# Patient Record
Sex: MALE | Race: WHITE | NOT HISPANIC OR LATINO
[De-identification: names, ages, dates, MRNs, and addresses within clinical notes are randomized per-mention and may not be internally consistent; named-entity substitution may affect disease eponyms.]

---

## 2021-10-25 PROBLEM — Z00.00 ENCOUNTER FOR PREVENTIVE HEALTH EXAMINATION: Status: ACTIVE | Noted: 2021-10-25

## 2021-10-28 ENCOUNTER — APPOINTMENT (OUTPATIENT)
Dept: CARDIOLOGY | Facility: CLINIC | Age: 66
End: 2021-10-28
Payer: MEDICARE

## 2021-10-28 VITALS
WEIGHT: 227 LBS | TEMPERATURE: 98.4 F | HEART RATE: 97 BPM | DIASTOLIC BLOOD PRESSURE: 86 MMHG | RESPIRATION RATE: 15 BRPM | HEIGHT: 70 IN | OXYGEN SATURATION: 95 % | BODY MASS INDEX: 32.5 KG/M2 | SYSTOLIC BLOOD PRESSURE: 138 MMHG

## 2021-10-28 VITALS — SYSTOLIC BLOOD PRESSURE: 160 MMHG | DIASTOLIC BLOOD PRESSURE: 80 MMHG

## 2021-10-28 PROCEDURE — 99204 OFFICE O/P NEW MOD 45 MIN: CPT

## 2021-10-28 PROCEDURE — 93000 ELECTROCARDIOGRAM COMPLETE: CPT

## 2021-10-28 NOTE — PHYSICAL EXAM
[Well Developed] : well developed [Well Nourished] : well nourished [No Acute Distress] : no acute distress [Normal Conjunctiva] : normal conjunctiva [Normal Venous Pressure] : normal venous pressure [No Carotid Bruit] : no carotid bruit [Normal S1, S2] : normal S1, S2 [No Rub] : no rub [No Gallop] : no gallop [5th Left ICS - MCL] : palpated at the 5th LICS in the midclavicular line [Normal] : normal [Normal Rate] : normal [Rhythm Regular] : regular [Normal S1] : normal S1 [Normal S2] : normal S2 [I] : a grade 1 [Constant] : constant [2+] : left 2+ [Clear Lung Fields] : clear lung fields [Good Air Entry] : good air entry [No Respiratory Distress] : no respiratory distress  [Soft] : abdomen soft [Non Tender] : non-tender [No Masses/organomegaly] : no masses/organomegaly [Normal Bowel Sounds] : normal bowel sounds [Normal Gait] : normal gait [No Edema] : no edema [No Cyanosis] : no cyanosis [No Clubbing] : no clubbing [No Varicosities] : no varicosities [No Rash] : no rash [No Skin Lesions] : no skin lesions [Moves all extremities] : moves all extremities [No Focal Deficits] : no focal deficits [Normal Speech] : normal speech [Alert and Oriented] : alert and oriented [Normal memory] : normal memory

## 2021-10-28 NOTE — REASON FOR VISIT
[CV Risk Factors and Non-Cardiac Disease] : CV risk factors and non-cardiac disease [Hyperlipidemia] : hyperlipidemia [Hypertension] : hypertension [Carotid, Aortic and Peripheral Vascular Disease] : carotid, aortic and peripheral vascular disease [FreeTextEntry3] : Dr. Cardona [FreeTextEntry1] : 67yo M with a PMHx of HTN, HLD, PVD presents to the office for cardiac evaluation.  Pt was seen by his PMD Dr. Cardona x 2 weeks ago and was recently started on amlodipine 5mg daily for his elevated BP.  Pt had LYNDA performed on 9/20/21 due to his intermittent claudication.  Study showed diffuse partially calcified plaque present in the external iliac, common femoral, and popliteal arteries with biphasic waveform and Doppler spectral broadening through systole.  Three vessel runoff is present in the RLE and two vessel run off is present in the LLE; Patent left peroneal artery is not visualized;  Pt was placed on cilostazol 100mg BID by his PMD for such findings and is f/u with in 1-2 weeks.  Pt had a stent placed in his R leg on 12/5/2014.  Pt also had a B/L Duplex US of his carotid arteries on 9/20/21 which did not show any significant stenosis or occlusion.\par \par Pt states that he sometimes feels SOB after walking about 2 blocks. Pt denies CP, palpitations,dizziness, LE edema.\par \par Lipid Panel performed on 9/14/21 showed a total cholesterol of 208, triglycerides of 245, LDL of 117.\par \par Pt is a current every day smoker (iPPD x 52 years)\par \par Family Hx: Mother- HTN, DM; Father- HTN\par \par

## 2021-10-28 NOTE — DISCUSSION/SUMMARY
[FreeTextEntry1] : This is a 66-year-old male with past medical history significant for peripheral arterial disease, Status post stent to his right lower extremity, hypertension, hyperlipidemia, who comes in for cardiac consultation.  He denies chest pain, shortness of breath, dizziness or syncope.\par He has no history of rheumatic fever.  His cardiac risk factors include smoking up to 3 packs per day for over 30 years, is currently reducing his nicotine intake), hypertension, and hyperlipidemia.\par He has claudication in his left lower extremity when he walks more than one block.\par Electrocardiogram done October 28, 2021 and showed normal sinus rhythm rate 97 beats per min is otherwise remarkable for right bundle branch block, and T-wave inversions in V1 through V4.\par \par A lipid panel done by his primary care physician September 14, 2021 demonstrated an LDL cholesterol calculated at 117 mg/dL, total cholesterol 208 mg/dL combination of 54 mg/dL, triglycerides 245 mg/dL.\par He was recently started on Norvasc 5 mg daily for hypertension, and Pletal 100 mg twice a day.\par Smoking cessation was discussed in detail and reinforced.\par The patient will schedule an exercise stress test to rule out significant coronary artery disease.\par We will schedule a Doppler examination to evaluate a murmur, left ventricular function, chamber size, rule out hypertrophy.\par The patient had LYNDA performed on 9/20/21 due to his intermittent claudication.  Study showed diffuse partially calcified plaque present in the external iliac, common femoral, and popliteal arteries with biphasic waveform and Doppler spectral broadening through systole.  Three vessel runoff is present in the RLE and two vessel run off is present in the LLE; Patent left peroneal artery is not visualized;  Pt was placed on cilostazol 100mg BID by his PMD for such findings and is f/u with in 1-2 weeks.  Pt had a stent placed in his R leg on 12/5/2014.  Pt also had a B/L Duplex US of his carotid arteries on 9/20/21 which did not show any significant stenosis or occlusion.\par Pt states that he sometimes feels SOB after walking about 2 blocks. \par Pt is a current every day smoker (iPPD x 52 years)\par \par The patient was started on Crestor 20 mg per day given the presence of atherosclerotic disease with an LDL target of less than 70 mg/dL .He will have new blood work in 6-8 weeks.\par \par The patient is instructed to followup with his primary care physician.\par \par Thank you for allowing participate in the care of your patient.\par Please do not hesitate to call for any further questions.

## 2021-11-01 RX ORDER — CILOSTAZOL 100 MG/1
100 TABLET ORAL TWICE DAILY
Qty: 60 | Refills: 3 | Status: ACTIVE | COMMUNITY
Start: 2021-11-01

## 2021-11-18 ENCOUNTER — APPOINTMENT (OUTPATIENT)
Dept: CARDIOLOGY | Facility: CLINIC | Age: 66
End: 2021-11-18
Payer: MEDICARE

## 2021-11-18 PROCEDURE — 93306 TTE W/DOPPLER COMPLETE: CPT

## 2022-01-11 ENCOUNTER — APPOINTMENT (OUTPATIENT)
Dept: CARDIOLOGY | Facility: CLINIC | Age: 67
End: 2022-01-11
Payer: MEDICARE

## 2022-01-11 VITALS
DIASTOLIC BLOOD PRESSURE: 80 MMHG | TEMPERATURE: 98 F | HEIGHT: 70 IN | HEART RATE: 96 BPM | WEIGHT: 225 LBS | RESPIRATION RATE: 15 BRPM | SYSTOLIC BLOOD PRESSURE: 160 MMHG | BODY MASS INDEX: 32.21 KG/M2 | OXYGEN SATURATION: 97 %

## 2022-01-11 PROCEDURE — 99213 OFFICE O/P EST LOW 20 MIN: CPT | Mod: 25

## 2022-01-11 PROCEDURE — 99406 BEHAV CHNG SMOKING 3-10 MIN: CPT

## 2022-01-11 PROCEDURE — 93015 CV STRESS TEST SUPVJ I&R: CPT

## 2022-01-11 NOTE — COUNSELING
[Yes] : Risk of tobacco use and health benefits of smoking cessation discussed: Yes [Cessation strategies including cessation program discussed] : Cessation strategies including cessation program discussed [Use of nicotine replacement therapies and other medications discussed] : Use of nicotine replacement therapies and other medications discussed [Encouraged to pick a quit date and identify support needed to quit] : Encouraged to pick a quit date and identify support needed to quit [No] : Not willing to quit smoking [FreeTextEntry1] : 4

## 2022-01-11 NOTE — REASON FOR VISIT
[CV Risk Factors and Non-Cardiac Disease] : CV risk factors and non-cardiac disease [Hyperlipidemia] : hyperlipidemia [Hypertension] : hypertension [Carotid, Aortic and Peripheral Vascular Disease] : carotid, aortic and peripheral vascular disease [FreeTextEntry3] : Dr. Cardona [FreeTextEntry1] : 68 yo M with a PMHx of HTN, HLD, PVD presents to the office for follow up cardiac evaluation.  \par \par Today he is feeling generally well. Pt states that he sometimes feels SOB after walking about 2 blocks. Pt denies CP, palpitations,dizziness. He states that his blood pressure readings have been high and that his \par PMD increased his amlodipine from 5 mg to 10 mg. \par \par Patient had a hypertensive response to exercise stress test performed today, he lasted 2 mins 30 secs. \par \par Lipid Panel performed on 9/14/21 showed a total cholesterol of 208, triglycerides of 245, LDL of 117.\par \par Pt is a current every day smoker (1PPD x 52 years)\par \par Family Hx: Mother- HTN, DM; Father- HTN\par \par

## 2022-01-11 NOTE — DISCUSSION/SUMMARY
[FreeTextEntry1] : This is a 67-year-old male with past medical history significant for claudication/peripheral arterial disease, status post stent to his right lower extremity, hypertension, hyperlipidemia, right bundle branch block, who comes in for cardiac follow-up evaluation.  He denies chest pain, shortness of breath, dizziness or syncope.  He does have significant claudication. He has no history of rheumatic fever.  \par His cardiac risk factors include smoking up to 3 packs per day for over 30 years, is currently reducing his nicotine intake), hypertension, and hyperlipidemia.\par He has claudication in his left lower extremity when he walks more than one block.\par Echo Doppler examination done November 18, 2021 demonstrate minimal mitral valve regurgitation minimal tricuspid valve regurgitation with normal left ventricular ejection fraction 65%.\par Exercise stress test done today demonstrated no evidence of myocardial ischemia at a low workload; patient was only able to exercise into stage one of the Yaakov protocol.  Test was stopped due to claudication.\par The patient also had a hypertensive response to exercise.\par He will start on Micardis hydrochlorothiazide 80/12.5 mg in the morning, and continue amlodipine 10 mg in the evening/after dinner (this was recently increased by his primary care physician).\par The patient may have significant underlying coronary artery disease and I have recommended he schedule a coronary artery CTA to rule out significant coronary artery disease.\par I believe that a pharmacologic nuclear stress test with be abnormal given his body habitus, plus or minus the presence of coronary artery disease.\par He will follow-up with me in 1 month to reevaluate his blood pressure.  I have asked him to schedule a coronary CTA as soon as possible.  He will continue on aspirin 81 mg daily and Crestor 20 mg/day for primary prevention.\par \par A lipid panel done December 1, 2021 demonstrated cholesterol 136, HDL 60, triglycerides 91 mg/dL, LDL cholesterol 59 mg/dL and non-HDL cholesterol 76 mg/dL.\par The patient's LDL is at target.\par Smoking cessation was reinforced.\par Electrocardiogram done October 28, 2021 and showed normal sinus rhythm rate 97 beats per min is otherwise remarkable for right bundle branch block, and T-wave inversions in V1 through V4.\par \par A lipid panel September 14, 2021 demonstrated an LDL cholesterol calculated at 117 mg/dL, total cholesterol 208 mg/dL combination of 54 mg/dL, triglycerides 245 mg/dL.\par \par The patient had LYNDA performed on 9/20/21 due to his intermittent claudication.  Study showed diffuse partially calcified plaque present in the external iliac, common femoral, and popliteal arteries with biphasic waveform and Doppler spectral broadening through systole.  Three vessel runoff is present in the RLE and two vessel run off is present in the LLE; Patent left peroneal artery is not visualized;  Pt was placed on cilostazol 100mg BID by his PMD for such findings and is f/u with in 1-2 weeks.  Pt had a stent placed in his R leg on 12/5/2014.  Pt also had a B/L Duplex US of his carotid arteries on 9/20/21 which did not show any significant stenosis or occlusion.\par Pt states that he sometimes feels SOB after walking about 2 blocks. \par Pt is a current every day smoker (iPPD x 52 years)\par \par \par \par Thank you for allowing participate in the care of your patient.\par Please do not hesitate to call for any further questions.

## 2022-01-12 ENCOUNTER — NON-APPOINTMENT (OUTPATIENT)
Age: 67
End: 2022-01-12

## 2022-01-12 RX ORDER — AMLODIPINE BESYLATE 10 MG/1
10 TABLET ORAL
Refills: 0 | Status: ACTIVE | COMMUNITY
Start: 2021-11-01

## 2022-01-28 ENCOUNTER — APPOINTMENT (OUTPATIENT)
Dept: CT IMAGING | Facility: CLINIC | Age: 67
End: 2022-01-28
Payer: MEDICARE

## 2022-01-28 ENCOUNTER — OUTPATIENT (OUTPATIENT)
Dept: OUTPATIENT SERVICES | Facility: HOSPITAL | Age: 67
LOS: 1 days | End: 2022-01-28
Payer: MEDICARE

## 2022-01-28 DIAGNOSIS — I73.9 PERIPHERAL VASCULAR DISEASE, UNSPECIFIED: ICD-10-CM

## 2022-01-28 DIAGNOSIS — R06.02 SHORTNESS OF BREATH: ICD-10-CM

## 2022-01-28 PROCEDURE — G1004: CPT

## 2022-01-28 PROCEDURE — 75574 CT ANGIO HRT W/3D IMAGE: CPT | Mod: 26,ME

## 2022-01-28 PROCEDURE — 75574 CT ANGIO HRT W/3D IMAGE: CPT | Mod: ME

## 2022-01-28 PROCEDURE — 82565 ASSAY OF CREATININE: CPT

## 2022-02-01 ENCOUNTER — OUTPATIENT (OUTPATIENT)
Dept: OUTPATIENT SERVICES | Facility: HOSPITAL | Age: 67
LOS: 1 days | End: 2022-02-01
Payer: MEDICARE

## 2022-02-01 ENCOUNTER — RESULT REVIEW (OUTPATIENT)
Age: 67
End: 2022-02-01

## 2022-02-01 DIAGNOSIS — Z00.8 ENCOUNTER FOR OTHER GENERAL EXAMINATION: ICD-10-CM

## 2022-02-01 PROCEDURE — 0502T: CPT

## 2022-02-01 PROCEDURE — 0503T: CPT

## 2022-02-01 PROCEDURE — 0504T: CPT

## 2022-02-17 ENCOUNTER — APPOINTMENT (OUTPATIENT)
Dept: CARDIOLOGY | Facility: CLINIC | Age: 67
End: 2022-02-17
Payer: MEDICARE

## 2022-02-17 VITALS
WEIGHT: 228 LBS | DIASTOLIC BLOOD PRESSURE: 82 MMHG | TEMPERATURE: 97.6 F | BODY MASS INDEX: 32.64 KG/M2 | SYSTOLIC BLOOD PRESSURE: 133 MMHG | RESPIRATION RATE: 16 BRPM | HEART RATE: 95 BPM | HEIGHT: 70 IN

## 2022-02-17 PROCEDURE — 99214 OFFICE O/P EST MOD 30 MIN: CPT

## 2022-02-17 RX ORDER — ASPIRIN ENTERIC COATED TABLETS 81 MG 81 MG/1
81 TABLET, DELAYED RELEASE ORAL
Qty: 90 | Refills: 0 | Status: ACTIVE | COMMUNITY
Start: 2022-02-17

## 2022-02-17 NOTE — DISCUSSION/SUMMARY
[FreeTextEntry1] : This is a 67-year-old male with past medical history significant for claudication/peripheral arterial disease, status post stent to his right lower extremity, hypertension, hyperlipidemia, right bundle branch block, who comes in for cardiac follow-up evaluation.  He denies chest pain, shortness of breath, dizziness or syncope.  He does have significant claudication. He has no history of rheumatic fever.  \par His cardiac risk factors include smoking up to 3 packs per day for over 30 years, is currently reducing his nicotine intake), hypertension, and hyperlipidemia.\par The patient had a normal coronary CTA including a calcium score consistent with moderate cardiovascular risk.  He had FFR done which demonstrated a severe lesion in the diagonal branch, and borderline severe lesions in the left anterior descending artery and left circumflex branch.\par The patient's activity is limited by his claudication.  He continues to smoke one pack per day.  I believe the patient has significant coronary disease and have recommended a cardiac catheterization procedure.\par The patient will followup with me after that is completed.\par \par He has claudication in his left lower extremity when he walks more than one block.\par Echo Doppler examination done November 18, 2021 demonstrate minimal mitral valve regurgitation minimal tricuspid valve regurgitation with normal left ventricular ejection fraction 65%.\par Exercise stress test done today demonstrated no evidence of myocardial ischemia at a low workload; patient was only able to exercise into stage one of the Yaakov protocol.  Test was stopped due to claudication.\par The patient also had a hypertensive response to exercise.\par He will start on Micardis hydrochlorothiazide 80/12.5 mg in the morning, and continue amlodipine 10 mg in the evening/after dinner (this was recently increased by his primary care physician).\par The patient may have significant underlying coronary artery disease and I have recommended he schedule a coronary artery CTA to rule out significant coronary artery disease.\par I believe that a pharmacologic nuclear stress test with be abnormal given his body habitus, plus or minus the presence of coronary artery disease.\par He will follow-up with me in 1 month to reevaluate his blood pressure.  I have asked him to schedule a coronary CTA as soon as possible.  He will continue on aspirin 81 mg daily and Crestor 20 mg/day for primary prevention.\par \par A lipid panel done December 1, 2021 demonstrated cholesterol 136, HDL 60, triglycerides 91 mg/dL, LDL cholesterol 59 mg/dL and non-HDL cholesterol 76 mg/dL.\par The patient's LDL is at target.\par Smoking cessation was reinforced.\par Electrocardiogram done October 28, 2021 and showed normal sinus rhythm rate 97 beats per min is otherwise remarkable for right bundle branch block, and T-wave inversions in V1 through V4.\par \par A lipid panel September 14, 2021 demonstrated an LDL cholesterol calculated at 117 mg/dL, total cholesterol 208 mg/dL combination of 54 mg/dL, triglycerides 245 mg/dL.\par \par The patient had LYNDA performed on 9/20/21 due to his intermittent claudication.  Study showed diffuse partially calcified plaque present in the external iliac, common femoral, and popliteal arteries with biphasic waveform and Doppler spectral broadening through systole.  Three vessel runoff is present in the RLE and two vessel run off is present in the LLE; Patent left peroneal artery is not visualized;  Pt was placed on cilostazol 100mg BID by his PMD for such findings and is f/u with in 1-2 weeks.  Pt had a stent placed in his R leg on 12/5/2014.  Pt also had a B/L Duplex US of his carotid arteries on 9/20/21 which did not show any significant stenosis or occlusion.\par Pt states that he sometimes feels SOB after walking about 2 blocks. \par Pt is a current every day smoker (iPPD x 52 years)\par \par \par \par Thank you for allowing participate in the care of your patient.\par Please do not hesitate to call for any further questions.

## 2022-02-17 NOTE — REASON FOR VISIT
[CV Risk Factors and Non-Cardiac Disease] : CV risk factors and non-cardiac disease [Hyperlipidemia] : hyperlipidemia [Hypertension] : hypertension [Carotid, Aortic and Peripheral Vascular Disease] : carotid, aortic and peripheral vascular disease [FreeTextEntry3] : Dr. Cardona [FreeTextEntry1] : 66 yo M with a PMHx of HTN, HLD, PVD presents to the office for follow up cardiac evaluation.  \par \par Today he is feeling generally well. Pt states that he sometimes feels SOB after walking about 2 blocks. Pt denies CP, palpitations,dizziness. He states that his blood pressure readings have been high and that his \par PMD increased his amlodipine from 5 mg to 10 mg. \par \par Patient had a hypertensive response to exercise stress test performed today, he lasted 2 mins 30 secs. \par \par Lipid Panel performed on 9/14/21 showed a total cholesterol of 208, triglycerides of 245, LDL of 117.\par \par Pt is a current every day smoker (1PPD x 52 years)\par \par Family Hx: Mother- HTN, DM; Father- HTN\par \par

## 2022-02-28 ENCOUNTER — OUTPATIENT (OUTPATIENT)
Dept: OUTPATIENT SERVICES | Facility: HOSPITAL | Age: 67
LOS: 1 days | Discharge: HOME | End: 2022-02-28
Payer: MEDICARE

## 2022-02-28 VITALS
DIASTOLIC BLOOD PRESSURE: 68 MMHG | HEART RATE: 86 BPM | OXYGEN SATURATION: 97 % | RESPIRATION RATE: 18 BRPM | SYSTOLIC BLOOD PRESSURE: 144 MMHG

## 2022-02-28 VITALS
HEIGHT: 70 IN | RESPIRATION RATE: 18 BRPM | HEART RATE: 88 BPM | OXYGEN SATURATION: 98 % | SYSTOLIC BLOOD PRESSURE: 155 MMHG | DIASTOLIC BLOOD PRESSURE: 70 MMHG | TEMPERATURE: 98 F | WEIGHT: 220.02 LBS

## 2022-02-28 DIAGNOSIS — I73.9 PERIPHERAL VASCULAR DISEASE, UNSPECIFIED: Chronic | ICD-10-CM

## 2022-02-28 DIAGNOSIS — Z98.890 OTHER SPECIFIED POSTPROCEDURAL STATES: Chronic | ICD-10-CM

## 2022-02-28 DIAGNOSIS — I25.10 ATHEROSCLEROTIC HEART DISEASE OF NATIVE CORONARY ARTERY WITHOUT ANGINA PECTORIS: ICD-10-CM

## 2022-02-28 LAB
ALBUMIN SERPL ELPH-MCNC: 4.4 G/DL — SIGNIFICANT CHANGE UP (ref 3.3–5)
ALP SERPL-CCNC: 69 U/L — SIGNIFICANT CHANGE UP (ref 40–120)
ALT FLD-CCNC: 31 U/L — SIGNIFICANT CHANGE UP (ref 10–45)
ANION GAP SERPL CALC-SCNC: 11 MMOL/L — SIGNIFICANT CHANGE UP (ref 5–17)
AST SERPL-CCNC: 19 U/L — SIGNIFICANT CHANGE UP (ref 10–40)
BILIRUB SERPL-MCNC: 0.5 MG/DL — SIGNIFICANT CHANGE UP (ref 0.2–1.2)
BUN SERPL-MCNC: 27 MG/DL — HIGH (ref 7–23)
CALCIUM SERPL-MCNC: 9.6 MG/DL — SIGNIFICANT CHANGE UP (ref 8.4–10.5)
CHLORIDE SERPL-SCNC: 102 MMOL/L — SIGNIFICANT CHANGE UP (ref 96–108)
CO2 SERPL-SCNC: 23 MMOL/L — SIGNIFICANT CHANGE UP (ref 22–31)
CREAT SERPL-MCNC: 1.4 MG/DL — HIGH (ref 0.5–1.3)
GLUCOSE SERPL-MCNC: 114 MG/DL — HIGH (ref 70–99)
HCT VFR BLD CALC: 45.2 % — SIGNIFICANT CHANGE UP (ref 39–50)
HGB BLD-MCNC: 15.4 G/DL — SIGNIFICANT CHANGE UP (ref 13–17)
MCHC RBC-ENTMCNC: 31.6 PG — SIGNIFICANT CHANGE UP (ref 27–34)
MCHC RBC-ENTMCNC: 34.1 GM/DL — SIGNIFICANT CHANGE UP (ref 32–36)
MCV RBC AUTO: 92.8 FL — SIGNIFICANT CHANGE UP (ref 80–100)
NRBC # BLD: 0 /100 WBCS — SIGNIFICANT CHANGE UP (ref 0–0)
PLATELET # BLD AUTO: 230 K/UL — SIGNIFICANT CHANGE UP (ref 150–400)
POTASSIUM SERPL-MCNC: 4.6 MMOL/L — SIGNIFICANT CHANGE UP (ref 3.5–5.3)
POTASSIUM SERPL-SCNC: 4.6 MMOL/L — SIGNIFICANT CHANGE UP (ref 3.5–5.3)
PROT SERPL-MCNC: 7.1 G/DL — SIGNIFICANT CHANGE UP (ref 6–8.3)
RBC # BLD: 4.87 M/UL — SIGNIFICANT CHANGE UP (ref 4.2–5.8)
RBC # FLD: 12.4 % — SIGNIFICANT CHANGE UP (ref 10.3–14.5)
SODIUM SERPL-SCNC: 136 MMOL/L — SIGNIFICANT CHANGE UP (ref 135–145)
WBC # BLD: 9.73 K/UL — SIGNIFICANT CHANGE UP (ref 3.8–10.5)
WBC # FLD AUTO: 9.73 K/UL — SIGNIFICANT CHANGE UP (ref 3.8–10.5)

## 2022-02-28 PROCEDURE — 99152 MOD SED SAME PHYS/QHP 5/>YRS: CPT

## 2022-02-28 PROCEDURE — 93005 ELECTROCARDIOGRAM TRACING: CPT

## 2022-02-28 PROCEDURE — C1887: CPT

## 2022-02-28 PROCEDURE — 93010 ELECTROCARDIOGRAM REPORT: CPT

## 2022-02-28 PROCEDURE — C1769: CPT

## 2022-02-28 PROCEDURE — C1894: CPT

## 2022-02-28 PROCEDURE — 93458 L HRT ARTERY/VENTRICLE ANGIO: CPT

## 2022-02-28 PROCEDURE — 85027 COMPLETE CBC AUTOMATED: CPT

## 2022-02-28 PROCEDURE — 80053 COMPREHEN METABOLIC PANEL: CPT

## 2022-02-28 PROCEDURE — 93458 L HRT ARTERY/VENTRICLE ANGIO: CPT | Mod: 26

## 2022-02-28 RX ORDER — TELMISARTAN AND HYDROCHLOROTHIAZIDE 40; 12.5 MG/1; MG/1
1 TABLET ORAL
Qty: 0 | Refills: 0 | DISCHARGE

## 2022-02-28 RX ORDER — AMLODIPINE BESYLATE 2.5 MG/1
1 TABLET ORAL
Qty: 0 | Refills: 0 | DISCHARGE

## 2022-02-28 RX ORDER — ASPIRIN/CALCIUM CARB/MAGNESIUM 324 MG
1 TABLET ORAL
Qty: 0 | Refills: 0 | DISCHARGE

## 2022-02-28 RX ORDER — CILOSTAZOL 100 MG/1
1 TABLET ORAL
Qty: 0 | Refills: 0 | DISCHARGE

## 2022-02-28 RX ORDER — ROSUVASTATIN CALCIUM 5 MG/1
1 TABLET ORAL
Qty: 0 | Refills: 0 | DISCHARGE

## 2022-02-28 NOTE — H&P CARDIOLOGY - NSICDXFAMILYHX_GEN_ALL_CORE_FT
FAMILY HISTORY:  Father  Still living? Unknown  FH: HTN (hypertension), Age at diagnosis: Age Unknown    Mother  Still living? Unknown  FH: HTN (hypertension), Age at diagnosis: Age Unknown  FH: type 2 diabetes, Age at diagnosis: Age Unknown

## 2022-02-28 NOTE — H&P CARDIOLOGY - HISTORY OF PRESENT ILLNESS
This is a 66 yo male, current every day smoker (1PPD x 52 years),  with PMH of HTN, HLD, PVD, claudication/peripheral arterial disease, status post stent to his right lower extremity.  Seen and evaluated by Dr Bentley for c/c of dyspnea on exertion (after walking about 2 blocks). Pt denies CP, palpitations, dizziness. He states that his blood pressure readings have been high and that his PMD increased his amlodipine from 5 mg to 10 mg. Patient had a hypertensive response to exercise stress test performed on 2/17, he lasted 2 mins 30 secs; his ST revealed  no evidence of myocardial ischemia at a low workload.  He was only able to exercise into stage one of the Yaakov protocol. Test was stopped due to claudication.  In addition pt had a normal coronary CTA including a calcium score consistent with moderate cardiovascular risk. He had FFR done which demonstrated a severe lesion in the diagonal branch, and borderline severe lesions in the left anterior descending artery and left circumflex.  Echo Doppler examination done November 18, 2021 demonstrate minimal mitral valve regurgitation minimal tricuspid valve regurgitation with normal left ventricular ejection fraction 65%.  Given his symptomatology and multiple risk factors, presents here today for [facility] for further evaluation of CAD.                 This is a 68 yo male, current every day smoker (1PPD x 52 years),  with PMH of HTN, HLD, PVD, claudication/peripheral arterial disease, status post stent to his right lower extremity.  Seen and evaluated by Dr Bentley for c/c of dyspnea on exertion (after walking about 2 blocks). Pt denies CP, palpitations, dizziness. He states that his blood pressure readings have been high and that his PMD increased his amlodipine from 5 mg to 10 mg. Patient had a hypertensive response to exercise stress test performed on 2/17, he lasted 2 mins 30 secs; his ST revealed  no evidence of myocardial ischemia at a low workload.  He was only able to exercise into stage one of the Yaakov protocol. Test was stopped due to claudication.  In addition pt had a normal coronary CTA including a calcium score consistent with moderate cardiovascular risk. He had FFR done which demonstrated a severe lesion in the diagonal branch, and borderline severe lesions in the left anterior descending artery and left circumflex.  Echo Doppler examination done November 18, 2021 demonstrate minimal mitral valve regurgitation minimal tricuspid valve regurgitation with normal left ventricular ejection fraction 65%.  Given his symptomatology and multiple risk factors, presents here today for [facility] for further evaluation of CAD.      CT Angio  IMPRESSION:  1. Coronary artery calcium score = 264 Agatston Units (between the 50th   and 75th percentile for subjects of the same age and gender).    2. Coronaries:  --LM: contains mixed plaque with less than 25% luminal stenosis.  --LAD: contains regions of mixed plaque in the proximal segment with mild   (less than 50%) luminal stenosis.  There is myocardial bridging of the   mid LAD.  The first diagonal branch contains mixed plaque in the ostial   and proximal segments with severe (greater than 70%) luminal stenosis.    The small caliber second diagonal branch contains calcified plaque at the   ostium with an indeterminate degree of luminal stenosis.  --LCX: contains mostly calcified plaque in the proximal segment with   minimal (less than 25%) luminal stenosis.  --RCA: contains mixed plaque in the proximal and distal segments with   minimal (less than 30%) luminal stenosis.   There is mixed plaque in the   mid RCA with mild (approximately 30%) luminal stenosis.    3. CTA data will be sent for FFRct assessment to evaluate the hemodynamic   significance of the observed CAD.    4. There are bilateral ground glass opacities which are of uncertain   etiology. A follow up is suggested in 3 months to evaluate for   resolution/change.    EXAM: [id number] - FFR CT DATA PREP AND ANALYSIS#  - ORDERED BY: JAKE BENTLEY  PROCEDURE DATE:  02/01/2022   INTERPRETATION:  FFR CT REPORT    Indication:   67-year-old man with cardiac risk factors and CAD noted on   cardiac CTA.  FFRct analysis was performed on the original cardiac CT   angiogram dataset (performed 1/28/2022). This dictation was created using   the PDF document and an interactive 3D model of the analysis.    FINDINGS/  IMPRESSION:    LAD: The evaluable segments of the LAD demonstrate normal FFRct values   greater than 0.80 suggesting a low likelihood of lesion-specific   ischemia.  The noted severe stenosis in the first diagonal branch is   associated with an abnormal FFRct value of 0.75 suggestive of a high   likelihood of lesion-specific ischemia.    LCX: The evaluable portions of the LCX demonstrate normal FFRct values   greater than 0.80 suggesting a low likelihood of lesion-specific   ischemia.  The distal portion of the large first OM branch demonstrates   an FFRct value of 0.80; however, FFRct values less than or equal to 0.80   measured from the terminal vessel do not necessarily indicate   lesion-specific ischemia, particularly if there is only mild disease in   the more proximal segments of the vessel.    RCA: The evaluable segments of the RCA demonstrate normal FFRct values   greater than 0.80 suggesting a low likelihood of lesion-specific ischemia.      ___________________________________________________________________________  ____________  Reference Summary for Interpretation of Values (for reference only):  a. >0.80 (distal to the stenosis): Low likelihood of lesion-specific   ischemia  b. Less than or equal to 0.80 (distal to the stenosis): High likelihood   of lesion-specific ischemia  c. Assessment of lesion-specific ischemia by FFRCT should rely on FFRCT   values measured approximately 10-15 mm distal to the lesion of interest,   rather than from the terminal vessel. FFRCT values <0.80 measured from   the terminal vessel do not necessarily indicate lesion-specific ischemia,   particularly if there is only mild disease in the more proximal segments   of the vessel.       This is a 66 yo  male, current every day smoker (1PPD x 52 years) currently  "cutting down" as per pt,  with PMH of HTN, HLD, PVD, claudication/peripheral arterial disease, status post stent to his right lower extremity.  Seen and evaluated by Dr Bentley for c/c of dyspnea on exertion (after walking about 2 blocks). Pt denies CP, palpitations, dizziness. He states that his blood pressure readings have been high and that his PMD increased his amlodipine from 5 mg to 10 mg. Patient had a hypertensive response to exercise stress test performed on 2/17, he lasted 2 mins 30 secs; his ST revealed  no evidence of myocardial ischemia at a low workload.  He was only able to exercise into stage one of the Yaakov protocol. Test was stopped due to claudication.  In addition pt had a normal coronary CTA including a calcium score consistent with moderate cardiovascular risk. He had FFR done which demonstrated a severe lesion in the diagonal branch, and borderline severe lesions in the left anterior descending artery and left circumflex.  Echo Doppler examination done November 18, 2021 demonstrate minimal mitral valve regurgitation minimal tricuspid valve regurgitation with normal left ventricular ejection fraction 65%.  Given his symptomatology and multiple risk factors, presents here today for [facility] for further evaluation of CAD.      CT Angio  IMPRESSION:  1. Coronary artery calcium score = 264 Agatston Units (between the 50th   and 75th percentile for subjects of the same age and gender).    2. Coronaries:  --LM: contains mixed plaque with less than 25% luminal stenosis.  --LAD: contains regions of mixed plaque in the proximal segment with mild   (less than 50%) luminal stenosis.  There is myocardial bridging of the   mid LAD.  The first diagonal branch contains mixed plaque in the ostial   and proximal segments with severe (greater than 70%) luminal stenosis.    The small caliber second diagonal branch contains calcified plaque at the   ostium with an indeterminate degree of luminal stenosis.  --LCX: contains mostly calcified plaque in the proximal segment with   minimal (less than 25%) luminal stenosis.  --RCA: contains mixed plaque in the proximal and distal segments with   minimal (less than 30%) luminal stenosis.   There is mixed plaque in the   mid RCA with mild (approximately 30%) luminal stenosis.    3. CTA data will be sent for FFRct assessment to evaluate the hemodynamic   significance of the observed CAD.    4. There are bilateral ground glass opacities which are of uncertain   etiology. A follow up is suggested in 3 months to evaluate for   resolution/change.    EXAM: [id number] - FFR CT DATA PREP AND ANALYSIS#  - ORDERED BY: JAKE BENTLEY  PROCEDURE DATE:  02/01/2022   INTERPRETATION:  FFR CT REPORT    Indication:   67-year-old man with cardiac risk factors and CAD noted on   cardiac CTA.  FFRct analysis was performed on the original cardiac CT   angiogram dataset (performed 1/28/2022). This dictation was created using   the PDF document and an interactive 3D model of the analysis.    FINDINGS/  IMPRESSION:    LAD: The evaluable segments of the LAD demonstrate normal FFRct values   greater than 0.80 suggesting a low likelihood of lesion-specific   ischemia.  The noted severe stenosis in the first diagonal branch is   associated with an abnormal FFRct value of 0.75 suggestive of a high   likelihood of lesion-specific ischemia.    LCX: The evaluable portions of the LCX demonstrate normal FFRct values   greater than 0.80 suggesting a low likelihood of lesion-specific   ischemia.  The distal portion of the large first OM branch demonstrates   an FFRct value of 0.80; however, FFRct values less than or equal to 0.80   measured from the terminal vessel do not necessarily indicate   lesion-specific ischemia, particularly if there is only mild disease in   the more proximal segments of the vessel.    RCA: The evaluable segments of the RCA demonstrate normal FFRct values   greater than 0.80 suggesting a low likelihood of lesion-specific ischemia.      ___________________________________________________________________________  ____________  Reference Summary for Interpretation of Values (for reference only):  a. >0.80 (distal to the stenosis): Low likelihood of lesion-specific   ischemia  b. Less than or equal to 0.80 (distal to the stenosis): High likelihood   of lesion-specific ischemia  c. Assessment of lesion-specific ischemia by FFRCT should rely on FFRCT   values measured approximately 10-15 mm distal to the lesion of interest,   rather than from the terminal vessel. FFRCT values <0.80 measured from   the terminal vessel do not necessarily indicate lesion-specific ischemia,   particularly if there is only mild disease in the more proximal segments   of the vessel.

## 2022-02-28 NOTE — H&P CARDIOLOGY - NSICDXPASTMEDICALHX_GEN_ALL_CORE_FT
PAST MEDICAL HISTORY:  Claudication     Current smoker     HLD (hyperlipidemia)     HTN (hypertension)     PAD (peripheral artery disease)     PVD (peripheral vascular disease)     RBBB

## 2022-02-28 NOTE — ASU PATIENT PROFILE, ADULT - FALL HARM RISK - UNIVERSAL INTERVENTIONS
Bed in lowest position, wheels locked, appropriate side rails in place/Call bell, personal items and telephone in reach/Instruct patient to call for assistance before getting out of bed or chair/Non-slip footwear when patient is out of bed/[place] to call system/Physically safe environment - no spills, clutter or unnecessary equipment/Purposeful Proactive Rounding/Room/bathroom lighting operational, light cord in reach

## 2022-02-28 NOTE — ASU DISCHARGE PLAN (ADULT/PEDIATRIC) - CARE PROVIDER_API CALL
Carter Whitney (MD)  Cardiology; Cardiovascular Disease; Internal Medicine  [address]  [place] [address]  Phone: [phone]  Fax: [phone]  Follow Up Time: 2 weeks

## 2022-02-28 NOTE — H&P CARDIOLOGY - NSICDXPASTSURGICALHX_GEN_ALL_CORE_FT
PAST SURGICAL HISTORY:  Peripheral angiopathy      PAST SURGICAL HISTORY:  H/O arthroscopic knee surgery bilateral    Peripheral angiopathy remote, >10yrs ago

## 2022-02-28 NOTE — ASU DISCHARGE PLAN (ADULT/PEDIATRIC) - NS MD DC FALL RISK RISK
For information on Fall & Injury Prevention, visit: [email or web address] OR  [email or web address] OR  https://www.cdc.gov/steadi/patient.html

## 2022-03-05 PROBLEM — F17.200 NICOTINE DEPENDENCE, UNSPECIFIED, UNCOMPLICATED: Chronic | Status: ACTIVE | Noted: 2022-02-28

## 2022-03-05 PROBLEM — I10 ESSENTIAL (PRIMARY) HYPERTENSION: Chronic | Status: ACTIVE | Noted: 2022-02-28

## 2022-03-05 PROBLEM — I73.9 PERIPHERAL VASCULAR DISEASE, UNSPECIFIED: Chronic | Status: ACTIVE | Noted: 2022-02-28

## 2022-03-05 PROBLEM — I45.10 UNSPECIFIED RIGHT BUNDLE-BRANCH BLOCK: Chronic | Status: ACTIVE | Noted: 2022-02-28

## 2022-03-05 PROBLEM — E78.5 HYPERLIPIDEMIA, UNSPECIFIED: Chronic | Status: ACTIVE | Noted: 2022-02-28

## 2022-03-14 ENCOUNTER — APPOINTMENT (OUTPATIENT)
Dept: CARDIOLOGY | Facility: CLINIC | Age: 67
End: 2022-03-14

## 2022-03-30 ENCOUNTER — NON-APPOINTMENT (OUTPATIENT)
Age: 67
End: 2022-03-30

## 2022-03-30 ENCOUNTER — APPOINTMENT (OUTPATIENT)
Dept: CARDIOLOGY | Facility: CLINIC | Age: 67
End: 2022-03-30
Payer: MEDICARE

## 2022-03-30 VITALS
BODY MASS INDEX: 33.64 KG/M2 | HEART RATE: 90 BPM | OXYGEN SATURATION: 98 % | WEIGHT: 235 LBS | TEMPERATURE: 98.2 F | HEIGHT: 70 IN | DIASTOLIC BLOOD PRESSURE: 80 MMHG | SYSTOLIC BLOOD PRESSURE: 125 MMHG | RESPIRATION RATE: 16 BRPM

## 2022-03-30 DIAGNOSIS — R06.02 SHORTNESS OF BREATH: ICD-10-CM

## 2022-03-30 PROCEDURE — 93000 ELECTROCARDIOGRAM COMPLETE: CPT

## 2022-03-30 PROCEDURE — 99215 OFFICE O/P EST HI 40 MIN: CPT | Mod: 25

## 2022-03-30 RX ORDER — ASPIRIN 81 MG/1
81 TABLET ORAL
Qty: 90 | Refills: 1 | Status: DISCONTINUED | COMMUNITY
Start: 2021-11-01 | End: 2022-03-30

## 2022-03-30 NOTE — ASSESSMENT
[FreeTextEntry1] : This is a 67 year year old male here today for follow up cardiac evaluation. \par He has a past medical history significant for claudication/peripheral arterial disease, status post stent to his right lower extremity, hypertension, hyperlipidemia, right bundle branch block.\par \par He has no history of rheumatic fever.  \par \par His cardiac risk factors include smoking up to 3 packs per day for over 30 years, is currently reducing his nicotine intake), hypertension, and hyperlipidemia.\par \par CHIEF COMPLAINT:\par Today he is feeling generally well and does not have any complaints at this time. He is s/p cardiac cath done on 2/28/22 which demonstrated mild CAD 30% in the LAD and 20% in the first diagonal LAD, 40% stenosis in the RCA. He states that the procedure went well and he remains on ASA 81mg PO DAILY, Amlodipine 10mg PO DAILY, Telmisartan HCTZ 80/12.5mg PO DAILY and on Rosuvastatin 20mg PO DAILY. He understands his LDL goal needs to be 70 or less. \par \par He would like to make a note that although he is is feeling well from a cardiac standpoint that he is following up with his vascular doctor because he has RLE leg edema and he will be seeing vascular next week. He does have a hx of PVD. \par \par BLOOD PRESSURE:\par -BP is well controlled in today's visit and patient is on Telmisartan HCTZ 80/12.5mg PO DAILY and Amlodipine 10mg PO DAILY.\par \par -I have discussed the importance of maintaining good BP control and reviewed the newest guidelines with the patient while re-enforcing dietary sodium restrictions to no more than 2-3 g daily, DASH diet, life style modifications as well as the goal of maintaining ideal body weight with the patient today. I have advised the patient to avoid the use of over-the-counter medications/ supplements especially NSAIDS.\par \par I have reviewed with Codey MANUEL that serious health consequences can occur when blood pressure is not well controlled and the need for strict compliance with medication and that optimal control can significantly reduce the risk of cardiovascular disease stroke, heart attack and other organ damage. They verbally expressed understanding of the fore mentioned serious health consequences to me today.\par \par BLOOD WORK:\par -New blood work was done 2/9/22 demonstrated LDL of 50 which is at goal.\par -He is on Rosuvastatin 20mg PO DAILY. \par \par CHOLESTEROL CONTROL:\par -Patient will continue the advised  TLC diet and to continue follow-up for treatment of hyperlipidemia and repeat blood testing with diet and exercise. I have discussed different exercises and the importance of maintenance of optimal body weight. The importance of staying within guidelines and recommendations was stressed to the patient today and they acknowledged that they understand this to me verbally.\par \par  -Mr. JACKSON was educated and advised that failure to follow-up with my medical recommendations to lower cholesterol can result in severe health consequences therefore, they will continuing a low saturated and low fat diet and to avoid excessive carbohydrates to help reduce triglycerides and that lowering LDL levels is associated with a significant decrease in serious cardiac events including but not limited to heart attack stroke and overall death. We will continue lipid lowering agents as advised based on blood test results and the patient understands to call if they develop severe muscle discomfort or if they have a reddish tinted discoloration in their urine.\par \par TESTING/REPORTS:\par -EKG done Mar 30, 2022 which demonstrated regular sinus rhythm with nonspecific ST-T wave changes, BPM of 90 otherwise remarkable for right bundle branch block, and T-wave inversions in V1 through V4.\par \par -Electrocardiogram done October 28, 2021 and showed normal sinus rhythm rate 97 beats per min is otherwise remarkable for right bundle branch block, and T-wave inversions in V1 through V4.\par \par -Echo Doppler examination done November 18, 2021 demonstrate minimal mitral valve regurgitation minimal tricuspid valve regurgitation with normal left ventricular ejection fraction 65%.\par \par -Exercise stress test done 2/17/22 demonstrated no evidence of myocardial ischemia at a low workload; patient was only able to exercise into stage one of the Yaakov protocol.  Test was stopped due to claudication. The patient also had a hypertensive response to exercise.\par \par -The patient had a normal coronary CTA including a calcium score consistent with moderate cardiovascular risk.  He had FFR done which demonstrated a severe lesion in the diagonal branch, and borderline severe lesions in the left anterior descending artery and left circumflex branch.\par \par -Cardiac cath done on 2/28/22 which demonstrated mild CAD 30% in the LAD and 20% in the first diagonal LAD, 40% stenosis in the RCA. \par \par PMH:\par The patient had LYNDA performed on 9/20/21 due to his intermittent claudication.  Study showed diffuse partially calcified plaque present in the external iliac, common femoral, and popliteal arteries with biphasic waveform and Doppler spectral broadening through systole.  Three vessel runoff is present in the RLE and two vessel run off is present in the LLE; Patent left peroneal artery is not visualized;  Pt was placed on cilostazol 100mg BID by his PMD for such findings and is f/u with in 1-2 weeks.  Pt had a stent placed in his R leg on 12/5/2014.  Pt also had a B/L Duplex US of his carotid arteries on 9/20/21 which did not show any significant stenosis or occlusion.\par \par PLAN:\par -He will continue with his usual medications and will contact the office if he is having any complaints between now and their next follow up appointment.\par -We will do new blood work during his next follow up appointment to assess lipid panel. He understands he must have his LDL at goal of 70 or less.\par -Smoking cessation was enforced.\par -He will follow up with vascular in regards to his edema and claudication/PVD.\par \par I have discussed the plan of care with Mr. EVE JACKSON  and he  will follow up in 3 months. He is compliant with all of his medications.\par \par The patient understands that aerobic exercises must be increased to minutes 4 times/week and a detailed discussion of lifestyle modification was done today. \par The patient has a good understanding of the diagnosis, treatment plan and lifestyle modification. \par He will contact me at the office for any questions with their care or any changes in their health status.\par \par The plan of care was discussed with the patient with supervision physician Dr. Carter Whitney and will be carried out as noted above.\par \par Stephen ALMONTE

## 2022-03-30 NOTE — CARDIOLOGY SUMMARY
[de-identified] : 3/30/22 [de-identified] : 1/11/22 exercise stress test  [de-identified] : 11/18/21 [de-identified] : 2/28/22 [de-identified] : 2/1/22

## 2022-03-30 NOTE — REVIEW OF SYSTEMS
[SOB] : shortness of breath [Negative] : Heme/Lymph [Dyspnea on exertion] : dyspnea during exertion [Lower Ext Edema] : lower extremity edema

## 2022-03-30 NOTE — REASON FOR VISIT
[CV Risk Factors and Non-Cardiac Disease] : CV risk factors and non-cardiac disease [Hyperlipidemia] : hyperlipidemia [Hypertension] : hypertension [Carotid, Aortic and Peripheral Vascular Disease] : carotid, aortic and peripheral vascular disease [FreeTextEntry3] : Dr. Cardona [FreeTextEntry1] : This is a 67 year year old male here today for follow up cardiac evaluation. \par He has a past medical history significant for claudication/peripheral arterial disease, status post stent to his right lower extremity, hypertension, hyperlipidemia, right bundle branch block.\par \par CHIEF COMPLAINT:\par Today he is feeling generally well and does not have any complaints at this time. He is s/p cardiac cath done on 2/28/22 which demonstrated mild CAD 30% in the LAD and 20% in the first diagonal LAD, 40% stenosis in the RCA. He states that the procedure went well and he remains on ASA 81mg PO DAILY, Amlodipine 10mg PO DAILY, Telmisartan HCTZ 80/12.5mg PO DAILY and on Rosuvastatin 20mg PO DAILY. He understands his LDL goal needs to be 70 or less. \par \par He would like to make a note that although he is is feeling well from a cardiac standpoint that he is following up with his vascular doctor because he has RLE leg edema and he will be seeing vascular next week. He does have a hx of PVD. \par \par \par He denies fever, chills, weight loss, malaise, rash, alteration bowel habits, weakness, abdominal  pain, bloating, changes in urination, visual disturbances, chest pain, headaches, dizziness, heart palpitations, recent episodes of syncope or falls at this time.\par \par   \par \par \par

## 2022-03-30 NOTE — DISCUSSION/SUMMARY
[FreeTextEntry1] : Dr. Whitney-(PRIOR VISIT and PMH WITH Dr. Whitney): \par This is a 67-year-old male with past medical history significant for claudication/peripheral arterial disease, status post stent to his right lower extremity, hypertension, hyperlipidemia, right bundle branch block, who comes in for cardiac follow-up evaluation.  He denies chest pain, shortness of breath, dizziness or syncope.  \par \par He does have significant claudication. \par \par He has no history of rheumatic fever.  \par His cardiac risk factors include smoking up to 3 packs per day for over 30 years, is currently reducing his nicotine intake), hypertension, and hyperlipidemia.\par \par The patient had a normal coronary CTA including a calcium score consistent with moderate cardiovascular risk.  He had FFR done which demonstrated a severe lesion in the diagonal branch, and borderline severe lesions in the left anterior descending artery and left circumflex branch.\par \par The patient's activity is limited by his claudication.  He continues to smoke one pack per day.  I believe the patient has significant coronary disease and have recommended a cardiac catheterization procedure.\par The patient will followup with me after that is completed.\par \par He has claudication in his left lower extremity when he walks more than one block.\par \par -Echo Doppler examination done November 18, 2021 demonstrate minimal mitral valve regurgitation minimal tricuspid valve regurgitation with normal left ventricular ejection fraction 65%.\par \par -Exercise stress test done 2/17/22 demonstrated no evidence of myocardial ischemia at a low workload; patient was only able to exercise into stage one of the Yaakov protocol.  Test was stopped due to claudication. The patient also had a hypertensive response to exercise.\par \par -Electrocardiogram done October 28, 2021 and showed normal sinus rhythm rate 97 beats per min is otherwise remarkable for right bundle branch block, and T-wave inversions in V1 through V4.\par \par He will start on Micardis hydrochlorothiazide 80/12.5 mg in the morning, and continue amlodipine 10 mg in the evening/after dinner (this was recently increased by his primary care physician).\par \par The patient may have significant underlying coronary artery disease and I have recommended he schedule a coronary artery CTA to rule out significant coronary artery disease.\par \par I believe that a pharmacologic nuclear stress test with be abnormal given his body habitus, plus or minus the presence of coronary artery disease.\par \par He will follow-up with me in 1 month to reevaluate his blood pressure.  I have asked him to schedule a coronary CTA as soon as possible.  He will continue on aspirin 81 mg daily and Crestor 20 mg/day for primary prevention.\par \par A lipid panel done December 1, 2021 demonstrated cholesterol 136, HDL 60, triglycerides 91 mg/dL, LDL cholesterol 59 mg/dL and non-HDL cholesterol 76 mg/dL.\par The patient's LDL is at target.\par Smoking cessation was reinforced.\par \par A lipid panel September 14, 2021 demonstrated an LDL cholesterol calculated at 117 mg/dL, total cholesterol 208 mg/dL combination of 54 mg/dL, triglycerides 245 mg/dL.\par \par The patient had LYNDA performed on 9/20/21 due to his intermittent claudication.  Study showed diffuse partially calcified plaque present in the external iliac, common femoral, and popliteal arteries with biphasic waveform and Doppler spectral broadening through systole.  Three vessel runoff is present in the RLE and two vessel run off is present in the LLE; Patent left peroneal artery is not visualized;  Pt was placed on cilostazol 100mg BID by his PMD for such findings and is f/u with in 1-2 weeks.  Pt had a stent placed in his R leg on 12/5/2014.  Pt also had a B/L Duplex US of his carotid arteries on 9/20/21 which did not show any significant stenosis or occlusion.\par \par Thank you for allowing participate in the care of your patient.\par Please do not hesitate to call for any further questions.

## 2022-04-21 ENCOUNTER — APPOINTMENT (OUTPATIENT)
Dept: CARDIOLOGY | Facility: CLINIC | Age: 67
End: 2022-04-21

## 2022-07-13 ENCOUNTER — APPOINTMENT (OUTPATIENT)
Dept: CARDIOLOGY | Facility: CLINIC | Age: 67
End: 2022-07-13

## 2022-08-04 ENCOUNTER — APPOINTMENT (OUTPATIENT)
Dept: CARDIOLOGY | Facility: CLINIC | Age: 67
End: 2022-08-04

## 2022-08-04 VITALS
DIASTOLIC BLOOD PRESSURE: 70 MMHG | RESPIRATION RATE: 16 BRPM | HEIGHT: 70 IN | TEMPERATURE: 98.3 F | SYSTOLIC BLOOD PRESSURE: 125 MMHG | BODY MASS INDEX: 32.5 KG/M2 | WEIGHT: 227 LBS | OXYGEN SATURATION: 98 % | HEART RATE: 84 BPM

## 2022-08-04 DIAGNOSIS — I73.9 PERIPHERAL VASCULAR DISEASE, UNSPECIFIED: ICD-10-CM

## 2022-08-04 DIAGNOSIS — F17.200 NICOTINE DEPENDENCE, UNSPECIFIED, UNCOMPLICATED: ICD-10-CM

## 2022-08-04 PROCEDURE — 93000 ELECTROCARDIOGRAM COMPLETE: CPT

## 2022-08-04 PROCEDURE — 99214 OFFICE O/P EST MOD 30 MIN: CPT

## 2022-08-04 PROCEDURE — 99406 BEHAV CHNG SMOKING 3-10 MIN: CPT

## 2022-08-04 NOTE — REASON FOR VISIT
[CV Risk Factors and Non-Cardiac Disease] : CV risk factors and non-cardiac disease [Hyperlipidemia] : hyperlipidemia [Hypertension] : hypertension [Carotid, Aortic and Peripheral Vascular Disease] : carotid, aortic and peripheral vascular disease [FreeTextEntry3] : Dr. Cardona [FreeTextEntry1] : This is a 67 year old male with significant medical history of peripheral arterial disease status post right lower extremity stent, hypertension, hyperlipidemia, coronary artery disease, and right bundle branch block.\par \par CHIEF COMPLAINT:\par His main complaint is persisting left foot pain and discharge post bone spur removal a few weeks ago for which he continues to see a podiatrist. He continues to have an exercise tolerance of about 1-2 blocks walking before having to stop to rest. This is not associated with chest pain, worsening leg swelling, or palpitations. He otherwise denies orthopnea, r paroxysmal nocturnal dyspnea. He remains on ASA 81mg PO DAILY, Amlodipine 10mg PO DAILY, Telmisartan HCTZ 80/12.5mg PO DAILY and on Rosuvastatin 20mg PO DAILY. He understands his LDL goal needs to be 70 or less, and smoking cessation was strongly reinforced. He will follow up with his vascular doctor as well. \par \par He denies fever, chills, weight loss, malaise, rash, alteration bowel habits, weakness, abdominal  pain, bloating, changes in urination, visual disturbances, headaches, dizziness, and recent episodes of syncope or falls at this time.\par \par   \par \par \par

## 2022-08-04 NOTE — PHYSICAL EXAM
[Well Developed] : well developed [Well Nourished] : well nourished [No Acute Distress] : no acute distress [Normal Conjunctiva] : normal conjunctiva [Normal Venous Pressure] : normal venous pressure [No Carotid Bruit] : no carotid bruit [Normal S1, S2] : normal S1, S2 [No Rub] : no rub [No Gallop] : no gallop [5th Left ICS - MCL] : palpated at the 5th LICS in the midclavicular line [Normal] : normal [Normal Rate] : normal [Rhythm Regular] : regular [Normal S1] : normal S1 [Normal S2] : normal S2 [I] : a grade 1 [Constant] : constant [2+] : left 2+ [Clear Lung Fields] : clear lung fields [Good Air Entry] : good air entry [No Respiratory Distress] : no respiratory distress  [Soft] : abdomen soft [Non Tender] : non-tender [No Masses/organomegaly] : no masses/organomegaly [Normal Bowel Sounds] : normal bowel sounds [Normal Gait] : normal gait [No Cyanosis] : no cyanosis [No Clubbing] : no clubbing [No Varicosities] : no varicosities [No Rash] : no rash [No Skin Lesions] : no skin lesions [Moves all extremities] : moves all extremities [No Focal Deficits] : no focal deficits [Normal Speech] : normal speech [Alert and Oriented] : alert and oriented [Normal memory] : normal memory [Murmur] : murmur [Diminished Pedal Pulses ___] : diminished pedal pulses [unfilled] [Edema ___] : edema [unfilled] [de-identified] : Grade 2/5 pansystolic murmur heard best at cardiac apex

## 2022-08-04 NOTE — DISCUSSION/SUMMARY
[FreeTextEntry1] : This is a 67-year-old male with past medical history significant for claudication/peripheral arterial disease, status post stent to his right lower extremity, hypertension, hyperlipidemia, right bundle branch block, who comes in for cardiac follow-up evaluation.  He denies chest pain, shortness of breath, dizziness or syncope.  He does have significant claudication.  He had recent bone spur surgery and is complaining of pain in the hand and leg.\par He has no history of rheumatic fever.  \par His cardiac risk factors include smoking up to 3 packs per day for over 30 years, is currently reducing his nicotine intake), hypertension, and hyperlipidemia.\par \par Cardiac catheterization done February 28, 2022 demonstrated 30% lesion in the proximal left anterior descending artery and 20% lesion in the first diagonal branch, normal left circumflex artery, 40% lesion in the mid right coronary artery, and left main artery with luminal irregularities.\par Smoking cessation was reinforced again.\par I have also asked the patient to return to his surgeon regarding his postoperative pain.  He will also follow-up with his vascular surgeon.\par Blood work done February 9, 2022 demonstrated cholesterol 119, HDL 53, triglycerides of 81, LDL cholesterol of 50 mg/dL creatinine 1.34.\par Electrocardiogram done August 4, 2022 demonstrated normal sinus rhythm rate of 84 bpm is otherwise remarkable for right bundle rene block and nonspecific T wave inversions in V1 to V3 associated with right bundle branch block.\par Patient is currently hemodynamically stable and asymptomatic from a cardiac standpoint.  I have asked him to get new blood work with an LDL target of less than 70 mg/dL.  He verbalized a good understanding of the need to follow-up with his vascular surgeon as well as his surgeon who did his bone spur procedure.\par \par The patient had a normal coronary CTA including a calcium score consistent with moderate cardiovascular risk.  He had FFR done which demonstrated a severe lesion in the diagonal branch, and borderline severe lesions in the left anterior descending artery and left circumflex branch.\par \par The patient's activity is limited by his claudication.  He continues to smoke one pack per day.  I believe the patient has significant coronary disease and have recommended a cardiac catheterization procedure.\par The patient will followup with me after that is completed.\par \par He has claudication in his left lower extremity when he walks more than one block.\par \par -Echo Doppler examination done November 18, 2021 demonstrate minimal mitral valve regurgitation minimal tricuspid valve regurgitation with normal left ventricular ejection fraction 65%.\par \par -Exercise stress test done 2/17/22 demonstrated no evidence of myocardial ischemia at a low workload; patient was only able to exercise into stage one of the Yaakov protocol.  Test was stopped due to claudication. The patient also had a hypertensive response to exercise.\par \par -Electrocardiogram done October 28, 2021 and showed normal sinus rhythm rate 97 beats per min is otherwise remarkable for right bundle branch block, and T-wave inversions in V1 through V4.\par \par A lipid panel done December 1, 2021 demonstrated cholesterol 136, HDL 60, triglycerides 91 mg/dL, LDL cholesterol 59 mg/dL and non-HDL cholesterol 76 mg/dL.\par The patient's LDL is at target.\par Smoking cessation was reinforced.\par \par A lipid panel September 14, 2021 demonstrated an LDL cholesterol calculated at 117 mg/dL, total cholesterol 208 mg/dL combination of 54 mg/dL, triglycerides 245 mg/dL.\par \par The patient had LYNDA performed on 9/20/21 due to his intermittent claudication.  Study showed diffuse partially calcified plaque present in the external iliac, common femoral, and popliteal arteries with biphasic waveform and Doppler spectral broadening through systole.  Three vessel runoff is present in the RLE and two vessel run off is present in the LLE; Patent left peroneal artery is not visualized;  Pt was placed on cilostazol 100mg BID by his PMD for such findings and is f/u with in 1-2 weeks.  Pt had a stent placed in his R leg on 12/5/2014.  Pt also had a B/L Duplex US of his carotid arteries on 9/20/21 which did not show any significant stenosis or occlusion.\par \par Thank you for allowing participate in the care of your patient.\par Please do not hesitate to call for any further questions.

## 2022-08-04 NOTE — CARDIOLOGY SUMMARY
[de-identified] : 3/30/22 [de-identified] : 1/11/22 exercise stress test  [de-identified] : 11/18/21 [de-identified] : 2/1/22 [de-identified] : 2/28/22

## 2022-08-04 NOTE — COUNSELING
[Cessation strategies including cessation program discussed] : Cessation strategies including cessation program discussed [Use of nicotine replacement therapies and other medications discussed] : Use of nicotine replacement therapies and other medications discussed [Encouraged to pick a quit date and identify support needed to quit] : Encouraged to pick a quit date and identify support needed to quit [No] : Not willing to quit smoking [FreeTextEntry1] : 5

## 2022-10-20 ENCOUNTER — RX RENEWAL (OUTPATIENT)
Age: 67
End: 2022-10-20

## 2022-11-16 ENCOUNTER — NON-APPOINTMENT (OUTPATIENT)
Age: 67
End: 2022-11-16

## 2022-11-16 RX ORDER — TELMISARTAN AND HYDROCHLOROTHIAZIDE 80; 12.5 MG/1; MG/1
80-12.5 TABLET ORAL DAILY
Qty: 90 | Refills: 0 | Status: DISCONTINUED | COMMUNITY
Start: 2022-01-11 | End: 2022-11-16

## 2023-02-23 ENCOUNTER — APPOINTMENT (OUTPATIENT)
Dept: CARDIOLOGY | Facility: CLINIC | Age: 68
End: 2023-02-23

## 2023-05-08 ENCOUNTER — NON-APPOINTMENT (OUTPATIENT)
Age: 68
End: 2023-05-08

## 2023-05-08 ENCOUNTER — APPOINTMENT (OUTPATIENT)
Dept: CARDIOLOGY | Facility: CLINIC | Age: 68
End: 2023-05-08
Payer: MEDICARE

## 2023-05-08 VITALS
WEIGHT: 223 LBS | BODY MASS INDEX: 31.92 KG/M2 | OXYGEN SATURATION: 95 % | TEMPERATURE: 98.7 F | DIASTOLIC BLOOD PRESSURE: 72 MMHG | SYSTOLIC BLOOD PRESSURE: 148 MMHG | HEART RATE: 82 BPM | HEIGHT: 70 IN | RESPIRATION RATE: 16 BRPM

## 2023-05-08 PROCEDURE — 93000 ELECTROCARDIOGRAM COMPLETE: CPT

## 2023-05-08 PROCEDURE — 99215 OFFICE O/P EST HI 40 MIN: CPT

## 2023-05-08 NOTE — REASON FOR VISIT
[CV Risk Factors and Non-Cardiac Disease] : CV risk factors and non-cardiac disease [Hyperlipidemia] : hyperlipidemia [Hypertension] : hypertension [Carotid, Aortic and Peripheral Vascular Disease] : carotid, aortic and peripheral vascular disease [FreeTextEntry3] : Dr. Cardona [FreeTextEntry1] : This is a 68 year-old male with past medical history significant for claudication/peripheral arterial disease, status post stent to his right lower extremity, hypertension, hyperlipidemia, right bundle branch block, who comes in for cardiac follow-up evaluation.  Today he is feeling generally well and does not have any complaints at this time. He denies chest pain, shortness of breath, dizziness or syncope. \par \par Patient states he was in the hospital in February for a foot infection and had a stent put in by his vascular surgeon. Patient was taken off his Telmisartan 80 mg because his kidney function was going down. Patient hasn’t been taking Telmisartan since and has a high blood pressure on today's visit.  Patient follows closely with vascular. \par \par He remains on ASA 81mg PO DAILY, Amlodipine 10mg PO DAILY, Telmisartan HCTZ 80/12.5mg PO DAILY and on Rosuvastatin 20mg PO DAILY. \par \par His cardiac risk factors include smoking up to 3 packs per day for over 30 years (Patient still reports smoking <1 pack per day and has been smoking for the past 53 years) he is currently reducing his nicotine intake), hypertension, and hyperlipidemia.\par \par EKG done in office today 05/08/2023 demonstrated sinus rhythm at 82 BPM, left atrial enlargement and Right bundle branch block.

## 2023-05-08 NOTE — CARDIOLOGY SUMMARY
[de-identified] : 3/30/22 [de-identified] : 1/11/22 exercise stress test  [de-identified] : 11/18/21 [de-identified] : 2/1/22 [de-identified] : 2/28/22

## 2023-05-08 NOTE — DISCUSSION/SUMMARY
[FreeTextEntry1] : This is a 68-year-old male with past medical history significant for claudication/peripheral arterial disease status post stent to his left lower extremity April 2023 at [facility] (he has a history of stent to his right lower extremity), status post COVID-19 infection April 2023 (contracted at rehab facility), status post stent to his right lower extremity, hypertension, hyperlipidemia, right bundle branch block, who comes in for cardiac follow-up evaluation.  He denies chest pain, shortness of breath, dizziness or syncope.  He does have significant claudication.  He had recent bone spur surgery and is complaining of pain in the hand and leg.  The patient was subsequently hospitalized at [facility] with a nonhealing wound.  He was found to have significant left lower extremity peripheral arterial disease and had stent placed by Dr. Hill with improvement of the wound healing.\par He was transferred to a rehabilitation center for further healing and movement therapy and developed COVID-19 infection April 2023.\par He has no history of rheumatic fever.  \par His cardiac risk factors include smoking up to 3 packs per day for over 30 years, is currently reducing his nicotine intake), hypertension, and hyperlipidemia.\par Electrocardiogram done May 2023 demonstrated normal sinus rhythm rate of 82 bpm is otherwise remarkable for right bundle branch block and left atrial abnormality with nonspecific ST–T wave changes.\par The patient continues to smoke and smoking cessation was strongly encouraged.\par I am hopeful that the placement of a left lower extremity stent will motivate him to stop smoking.\par During his hospitalization for the wound infection I discontinued his Micardis therapy "may affect your kidneys".  He had recent blood work done May 1, 2023 with his primary care physician.  I have asked him to get me a copy of those results for my review.  Patient has been stable on Micardis therapy.  He has a history of chronic renal insufficiency.\par Cardiac catheterization done February 28, 2022 demonstrated 30% lesion in the proximal left anterior descending artery and 20% lesion in the first diagonal branch, normal left circumflex artery, 40% lesion in the mid right coronary artery, and left main artery with luminal irregularities.\par Smoking cessation was reinforced again.\par His LDL target remains less than 70 mg/dL.\par Blood work done February 9, 2022 demonstrated cholesterol 119, HDL 53, triglycerides of 81, LDL cholesterol of 50 mg/dL creatinine 1.34.\par Electrocardiogram done August 4, 2022 demonstrated normal sinus rhythm rate of 84 bpm is otherwise remarkable for right bundle rene block and nonspecific T wave inversions in V1 to V3 associated with right bundle branch block.\par Patient is currently hemodynamically stable and asymptomatic from a cardiac standpoint.  I have asked him to get new blood work with an LDL target of less than 70 mg/dL.  He verbalized a good understanding of the need to follow-up with his vascular surgeon as well as his surgeon who did his bone spur procedure.\par \par The patient had a normal coronary CTA including a calcium score consistent with moderate cardiovascular risk.  He had FFR done which demonstrated a severe lesion in the diagonal branch, and borderline severe lesions in the left anterior descending artery and left circumflex branch.\par \par The patient's activity is limited by his claudication.  He continues to smoke one pack per day.\par He has claudication in his left lower extremity when he walks more than one block.\par \par -Echo Doppler examination done November 18, 2021 demonstrate minimal mitral valve regurgitation minimal tricuspid valve regurgitation with normal left ventricular ejection fraction 65%.\par \par -Exercise stress test done 2/17/22 demonstrated no evidence of myocardial ischemia at a low workload; patient was only able to exercise into stage one of the Yaakov protocol.  Test was stopped due to claudication. The patient also had a hypertensive response to exercise.\par \par -Electrocardiogram done October 28, 2021 and showed normal sinus rhythm rate 97 beats per min is otherwise remarkable for right bundle branch block, and T-wave inversions in V1 through V4.\par \par A lipid panel done December 1, 2021 demonstrated cholesterol 136, HDL 60, triglycerides 91 mg/dL, LDL cholesterol 59 mg/dL and non-HDL cholesterol 76 mg/dL.\par The patient's LDL is at target.\par Smoking cessation was reinforced.\par \par A lipid panel September 14, 2021 demonstrated an LDL cholesterol calculated at 117 mg/dL, total cholesterol 208 mg/dL combination of 54 mg/dL, triglycerides 245 mg/dL.\par \par The patient had LYNDA performed on 9/20/21 due to his intermittent claudication.  Study showed diffuse partially calcified plaque present in the external iliac, common femoral, and popliteal arteries with biphasic waveform and Doppler spectral broadening through systole.  Three vessel runoff is present in the RLE and two vessel run off is present in the LLE; Patent left peroneal artery is not visualized;  Pt was placed on cilostazol 100mg BID by his PMD for such findings and is f/u with in 1-2 weeks.  Pt had a stent placed in his R leg on 12/5/2014.  Pt also had a B/L Duplex US of his carotid arteries on 9/20/21 which did not show any significant stenosis or occlusion.\par \par Thank you for allowing participate in the care of your patient.\par Please do not hesitate to call for any further questions.

## 2023-05-08 NOTE — PHYSICAL EXAM
[Well Developed] : well developed [Well Nourished] : well nourished [No Acute Distress] : no acute distress [Normal Conjunctiva] : normal conjunctiva [Normal Venous Pressure] : normal venous pressure [No Carotid Bruit] : no carotid bruit [Normal S1, S2] : normal S1, S2 [No Rub] : no rub [No Gallop] : no gallop [Murmur] : murmur [5th Left ICS - MCL] : palpated at the 5th LICS in the midclavicular line [Normal] : normal [Normal Rate] : normal [Rhythm Regular] : regular [Normal S1] : normal S1 [Normal S2] : normal S2 [I] : a grade 1 [Constant] : constant [2+] : left 2+ [Clear Lung Fields] : clear lung fields [Good Air Entry] : good air entry [No Respiratory Distress] : no respiratory distress  [Soft] : abdomen soft [Non Tender] : non-tender [No Masses/organomegaly] : no masses/organomegaly [Normal Bowel Sounds] : normal bowel sounds [Normal Gait] : normal gait [No Cyanosis] : no cyanosis [No Clubbing] : no clubbing [No Varicosities] : no varicosities [Diminished Pedal Pulses ___] : diminished pedal pulses [unfilled] [Edema ___] : edema [unfilled] [No Rash] : no rash [No Skin Lesions] : no skin lesions [Moves all extremities] : moves all extremities [No Focal Deficits] : no focal deficits [Normal Speech] : normal speech [Alert and Oriented] : alert and oriented [Normal memory] : normal memory [de-identified] : Grade 2/5 pansystolic murmur heard best at cardiac apex

## 2023-05-08 NOTE — ASSESSMENT
[FreeTextEntry1] : Prior note nurse practitioner Jada March 30, 2022::\par \par This is a 67 year year old male here today for follow up cardiac evaluation. \par He has a past medical history significant for claudication/peripheral arterial disease, status post stent to his right lower extremity, hypertension, hyperlipidemia, right bundle branch block.\par \par He has no history of rheumatic fever.  \par \par His cardiac risk factors include smoking up to 3 packs per day for over 30 years, is currently reducing his nicotine intake), hypertension, and hyperlipidemia.\par \par CHIEF COMPLAINT:\par Today he is feeling generally well and does not have any complaints at this time. He is s/p cardiac cath done on 2/28/22 which demonstrated mild CAD 30% in the LAD and 20% in the first diagonal LAD, 40% stenosis in the RCA. He states that the procedure went well and he remains on ASA 81mg PO DAILY, Amlodipine 10mg PO DAILY, Telmisartan HCTZ 80/12.5mg PO DAILY and on Rosuvastatin 20mg PO DAILY. He understands his LDL goal needs to be 70 or less. \par \par He would like to make a note that although he is is feeling well from a cardiac standpoint that he is following up with his vascular doctor because he has RLE leg edema and he will be seeing vascular next week. He does have a hx of PVD. \par \par BLOOD PRESSURE:\par -BP is well controlled in today's visit and patient is on Telmisartan HCTZ 80/12.5mg PO DAILY and Amlodipine 10mg PO DAILY.\par \par -I have discussed the importance of maintaining good BP control and reviewed the newest guidelines with the patient while re-enforcing dietary sodium restrictions to no more than 2-3 g daily, DASH diet, life style modifications as well as the goal of maintaining ideal body weight with the patient today. I have advised the patient to avoid the use of over-the-counter medications/ supplements especially NSAIDS.\par \par I have reviewed with Mr. JACKSON that serious health consequences can occur when blood pressure is not well controlled and the need for strict compliance with medication and that optimal control can significantly reduce the risk of cardiovascular disease stroke, heart attack and other organ damage. They verbally expressed understanding of the fore mentioned serious health consequences to me today.\par \par BLOOD WORK:\par -New blood work was done 2/9/22 demonstrated LDL of 50 which is at goal.\par -He is on Rosuvastatin 20mg PO DAILY. \par \par CHOLESTEROL CONTROL:\par -Patient will continue the advised  TLC diet and to continue follow-up for treatment of hyperlipidemia and repeat blood testing with diet and exercise. I have discussed different exercises and the importance of maintenance of optimal body weight. The importance of staying within guidelines and recommendations was stressed to the patient today and they acknowledged that they understand this to me verbally.\par \par  -Mr. JACKSON was educated and advised that failure to follow-up with my medical recommendations to lower cholesterol can result in severe health consequences therefore, they will continuing a low saturated and low fat diet and to avoid excessive carbohydrates to help reduce triglycerides and that lowering LDL levels is associated with a significant decrease in serious cardiac events including but not limited to heart attack stroke and overall death. We will continue lipid lowering agents as advised based on blood test results and the patient understands to call if they develop severe muscle discomfort or if they have a reddish tinted discoloration in their urine.\par \par TESTING/REPORTS:\par -EKG done Mar 30, 2022 which demonstrated regular sinus rhythm with nonspecific ST-T wave changes, BPM of 90 otherwise remarkable for right bundle branch block, and T-wave inversions in V1 through V4.\par \par -Electrocardiogram done October 28, 2021 and showed normal sinus rhythm rate 97 beats per min is otherwise remarkable for right bundle branch block, and T-wave inversions in V1 through V4.\par \par -Echo Doppler examination done November 18, 2021 demonstrate minimal mitral valve regurgitation minimal tricuspid valve regurgitation with normal left ventricular ejection fraction 65%.\par \par -Exercise stress test done 2/17/22 demonstrated no evidence of myocardial ischemia at a low workload; patient was only able to exercise into stage one of the Yaakov protocol.  Test was stopped due to claudication. The patient also had a hypertensive response to exercise.\par \par -The patient had a normal coronary CTA including a calcium score consistent with moderate cardiovascular risk.  He had FFR done which demonstrated a severe lesion in the diagonal branch, and borderline severe lesions in the left anterior descending artery and left circumflex branch.\par \par -Cardiac cath done on 2/28/22 which demonstrated mild CAD 30% in the LAD and 20% in the first diagonal LAD, 40% stenosis in the RCA. \par \par PMH:\par The patient had LYNDA performed on 9/20/21 due to his intermittent claudication.  Study showed diffuse partially calcified plaque present in the external iliac, common femoral, and popliteal arteries with biphasic waveform and Doppler spectral broadening through systole.  Three vessel runoff is present in the RLE and two vessel run off is present in the LLE; Patent left peroneal artery is not visualized;  Pt was placed on cilostazol 100mg BID by his PMD for such findings and is f/u with in 1-2 weeks.  Pt had a stent placed in his R leg on 12/5/2014.  Pt also had a B/L Duplex US of his carotid arteries on 9/20/21 which did not show any significant stenosis or occlusion.\par \par PLAN:\par -He will continue with his usual medications and will contact the office if he is having any complaints between now and their next follow up appointment.\par -We will do new blood work during his next follow up appointment to assess lipid panel. He understands he must have his LDL at goal of 70 or less.\par -Smoking cessation was enforced.\par -He will follow up with vascular in regards to his edema and claudication/PVD.\par \par I have discussed the plan of care with Mr. EVE JACKSON  and he  will follow up in 3 months. He is compliant with all of his medications.\par \par The patient understands that aerobic exercises must be increased to minutes 4 times/week and a detailed discussion of lifestyle modification was done today. \par The patient has a good understanding of the diagnosis, treatment plan and lifestyle modification. \par He will contact me at the office for any questions with their care or any changes in their health status.\par \par The plan of care was discussed with the patient with supervision physician Dr. Carter Whitney and will be carried out as noted above.\par \par Stephen ALMONTE

## 2023-05-09 RX ORDER — TELMISARTAN 80 MG/1
80 TABLET ORAL
Qty: 90 | Refills: 1 | Status: DISCONTINUED | COMMUNITY
Start: 2022-11-16 | End: 2023-05-09

## 2023-07-31 ENCOUNTER — APPOINTMENT (OUTPATIENT)
Dept: CARDIOLOGY | Facility: CLINIC | Age: 68
End: 2023-07-31
Payer: MEDICARE

## 2023-07-31 VITALS
BODY MASS INDEX: 32.21 KG/M2 | SYSTOLIC BLOOD PRESSURE: 140 MMHG | HEIGHT: 70 IN | RESPIRATION RATE: 16 BRPM | WEIGHT: 225 LBS | HEART RATE: 73 BPM | OXYGEN SATURATION: 96 % | DIASTOLIC BLOOD PRESSURE: 72 MMHG | TEMPERATURE: 98.8 F

## 2023-07-31 DIAGNOSIS — I73.9 PERIPHERAL VASCULAR DISEASE, UNSPECIFIED: ICD-10-CM

## 2023-07-31 DIAGNOSIS — R60.0 LOCALIZED EDEMA: ICD-10-CM

## 2023-07-31 PROCEDURE — 99214 OFFICE O/P EST MOD 30 MIN: CPT

## 2023-07-31 PROCEDURE — 93000 ELECTROCARDIOGRAM COMPLETE: CPT

## 2023-07-31 NOTE — DISCUSSION/SUMMARY
[FreeTextEntry1] : This is a 68-year-old male with past medical history significant for claudication/peripheral arterial disease status post stent to his left lower extremity April 2023 at [facility] (he has a history of stent to his right lower extremity), status post COVID-19 infection April 2023 (contracted at rehab facility), status post stent to his right lower extremity, hypertension, hyperlipidemia, right bundle branch block, who comes in for cardiac follow-up evaluation.  He denies chest pain, shortness of breath, dizziness or syncope.  He does have significant claudication.  He had recent bone spur surgery and is complaining of pain in the hand and leg.  The patient was subsequently hospitalized at [facility] with a nonhealing wound.  He was found to have significant left lower extremity peripheral arterial disease and had stent placed by Dr. Hill with improvement of the wound healing. He was transferred to a rehabilitation center for further healing and movement therapy and developed COVID-19 infection April 2023. He has no history of rheumatic fever.   His cardiac risk factors include smoking up to 3 packs per day for over 30 years, is currently reducing his nicotine intake), hypertension, and hyperlipidemia. Electrocardiogram done July 31, 2023 demonstrated normal sinus rhythm at a rate of 73 bpm is otherwise remarkable for right bundle branch block and nonspecific ST wave changes. Patient's blood pressure is slightly elevated today and I have asked him to follow-up in 6 weeks.  He is on amlodipine 10 mg daily and Crestor 20 mg/day.  He reports that his nephrologist had taken him off his ARB therapy.  Electrocardiogram done May 2023 demonstrated normal sinus rhythm rate of 82 bpm is otherwise remarkable for right bundle branch block and left atrial abnormality with nonspecific ST-T wave changes. The patient continues to smoke and smoking cessation was strongly encouraged. I am hopeful that the placement of a left lower extremity stent will motivate him to stop smoking. During his hospitalization for the wound infection I discontinued his Micardis therapy "may affect your kidneys".  He had recent blood work done May 1, 2023 with his primary care physician.  I have asked him to get me a copy of those results for my review.  Patient has been stable on Micardis therapy.  He has a history of chronic renal insufficiency. Cardiac catheterization done February 28, 2022 demonstrated 30% lesion in the proximal left anterior descending artery and 20% lesion in the first diagonal branch, normal left circumflex artery, 40% lesion in the mid right coronary artery, and left main artery with luminal irregularities. Smoking cessation was reinforced again. His LDL target remains less than 70 mg/dL. Blood work done February 9, 2022 demonstrated cholesterol 119, HDL 53, triglycerides of 81, LDL cholesterol of 50 mg/dL creatinine 1.34. Electrocardiogram done August 4, 2022 demonstrated normal sinus rhythm rate of 84 bpm is otherwise remarkable for right bundle rene block and nonspecific T wave inversions in V1 to V3 associated with right bundle branch block. Patient is currently hemodynamically stable and asymptomatic from a cardiac standpoint.  I have asked him to get new blood work with an LDL target of less than 70 mg/dL.  He verbalized a good understanding of the need to follow-up with his vascular surgeon as well as his surgeon who did his bone spur procedure.  The patient had a normal coronary CTA including a calcium score consistent with moderate cardiovascular risk.  He had FFR done which demonstrated a severe lesion in the diagonal branch, and borderline severe lesions in the left anterior descending artery and left circumflex branch.  The patient's activity is limited by his claudication.  He continues to smoke one pack per day. He has claudication in his left lower extremity when he walks more than one block.  -Echo Doppler examination done November 18, 2021 demonstrate minimal mitral valve regurgitation minimal tricuspid valve regurgitation with normal left ventricular ejection fraction 65%.  -Exercise stress test done 2/17/22 demonstrated no evidence of myocardial ischemia at a low workload; patient was only able to exercise into stage one of the Yaakov protocol.  Test was stopped due to claudication. The patient also had a hypertensive response to exercise.  -Electrocardiogram done October 28, 2021 and showed normal sinus rhythm rate 97 beats per min is otherwise remarkable for right bundle branch block, and T-wave inversions in V1 through V4.  A lipid panel done December 1, 2021 demonstrated cholesterol 136, HDL 60, triglycerides 91 mg/dL, LDL cholesterol 59 mg/dL and non-HDL cholesterol 76 mg/dL. The patient's LDL is at target. Smoking cessation was reinforced.  A lipid panel September 14, 2021 demonstrated an LDL cholesterol calculated at 117 mg/dL, total cholesterol 208 mg/dL combination of 54 mg/dL, triglycerides 245 mg/dL.  The patient had LYNDA performed on 9/20/21 due to his intermittent claudication.  Study showed diffuse partially calcified plaque present in the external iliac, common femoral, and popliteal arteries with biphasic waveform and Doppler spectral broadening through systole.  Three vessel runoff is present in the RLE and two vessel run off is present in the LLE; Patent left peroneal artery is not visualized;  Pt was placed on cilostazol 100mg BID by his PMD for such findings and is f/u with in 1-2 weeks.  Pt had a stent placed in his R leg on 12/5/2014.  Pt also had a B/L Duplex US of his carotid arteries on 9/20/21 which did not show any significant stenosis or occlusion. The patient understands that aerobic exercises must be increased to 40 minutes 4 times per week. A detailed discussion of lifestyle modification was done today. The patient has a good understanding of the diagnosis, and treatment plan. Lifestyle modification was also outlined. Thank you for allowing participate in the care of your patient. Please do not hesitate to call for any further questions.

## 2023-07-31 NOTE — REASON FOR VISIT
[CV Risk Factors and Non-Cardiac Disease] : CV risk factors and non-cardiac disease [Hyperlipidemia] : hyperlipidemia [Hypertension] : hypertension [Carotid, Aortic and Peripheral Vascular Disease] : carotid, aortic and peripheral vascular disease [FreeTextEntry3] : Dr. Cardona [FreeTextEntry1] : This is a 68 year-old male with past medical history significant for claudication/peripheral arterial disease, status post stent to his left lower extremity, hypertension, hyperlipidemia, right bundle branch block, who comes in for cardiac follow-up evaluation.  Today he is feeling generally well and does not have any complaints at this time. He denies chest pain, shortness of breath, RODRIGUEZ, palpitations, dizziness or syncope. Patient states he was in the hospital in February for a foot infection and had a stent put in by his vascular surgeon. Patient was taken off his Telmisartan 80 mg because his kidney function was going down. Patient hasn't been taking Telmisartan since and has a high blood pressure on today's visit 140/72.  Patient follows closely with vascular.   He remains on ASA 81mg PO DAILY, Amlodipine 10mg PO DAILY, PO DAILY and on Rosuvastatin 20mg PO DAILY.  His cardiac risk factors include smoking up to 3 packs per day for over 30 years (Patient still reports smoking <1 pack per day and has been smoking for the past 53 years) he is currently reducing his nicotine intake), hypertension, and hyperlipidemia.  EKG done in office today 06/31/2023 demonstrated sinus rhythm at 73 BPM, left atrial enlargement and Right bundle branch block, RAD.

## 2023-07-31 NOTE — PHYSICAL EXAM
[Well Developed] : well developed [Well Nourished] : well nourished [No Acute Distress] : no acute distress [Normal Conjunctiva] : normal conjunctiva [Normal Venous Pressure] : normal venous pressure [No Carotid Bruit] : no carotid bruit [Normal S1, S2] : normal S1, S2 [No Rub] : no rub [No Gallop] : no gallop [Murmur] : murmur [5th Left ICS - MCL] : palpated at the 5th LICS in the midclavicular line [Normal] : normal [Normal Rate] : normal [Rhythm Regular] : regular [Normal S1] : normal S1 [Normal S2] : normal S2 [I] : a grade 1 [Constant] : constant [2+] : left 2+ [Clear Lung Fields] : clear lung fields [Good Air Entry] : good air entry [No Respiratory Distress] : no respiratory distress  [Soft] : abdomen soft [Non Tender] : non-tender [No Masses/organomegaly] : no masses/organomegaly [Normal Bowel Sounds] : normal bowel sounds [Normal Gait] : normal gait [No Cyanosis] : no cyanosis [No Clubbing] : no clubbing [No Varicosities] : no varicosities [Diminished Pedal Pulses ___] : diminished pedal pulses [unfilled] [Edema ___] : edema [unfilled] [No Rash] : no rash [No Skin Lesions] : no skin lesions [Moves all extremities] : moves all extremities [No Focal Deficits] : no focal deficits [Normal Speech] : normal speech [Alert and Oriented] : alert and oriented [Normal memory] : normal memory [de-identified] : Grade 2/5 pansystolic murmur heard best at cardiac apex

## 2023-07-31 NOTE — CARDIOLOGY SUMMARY
[de-identified] : 3/30/22 [de-identified] : 1/11/22 exercise stress test  [de-identified] : 11/18/21 [de-identified] : 2/1/22 [de-identified] : 2/28/22

## 2023-10-30 ENCOUNTER — RX RENEWAL (OUTPATIENT)
Age: 68
End: 2023-10-30

## 2023-11-07 ENCOUNTER — APPOINTMENT (OUTPATIENT)
Dept: CARDIOLOGY | Facility: CLINIC | Age: 68
End: 2023-11-07

## 2023-11-28 ENCOUNTER — RX CHANGE (OUTPATIENT)
Age: 68
End: 2023-11-28

## 2024-03-04 ENCOUNTER — LABORATORY RESULT (OUTPATIENT)
Age: 69
End: 2024-03-04

## 2024-03-04 ENCOUNTER — NON-APPOINTMENT (OUTPATIENT)
Age: 69
End: 2024-03-04

## 2024-03-04 ENCOUNTER — APPOINTMENT (OUTPATIENT)
Dept: CARDIOLOGY | Facility: CLINIC | Age: 69
End: 2024-03-04
Payer: MEDICARE

## 2024-03-04 VITALS
DIASTOLIC BLOOD PRESSURE: 84 MMHG | SYSTOLIC BLOOD PRESSURE: 133 MMHG | WEIGHT: 220 LBS | OXYGEN SATURATION: 96 % | TEMPERATURE: 98.4 F | HEART RATE: 83 BPM | HEIGHT: 70 IN | RESPIRATION RATE: 16 BRPM | BODY MASS INDEX: 31.5 KG/M2

## 2024-03-04 DIAGNOSIS — I25.84 ATHEROSCLEROTIC HEART DISEASE OF NATIVE CORONARY ARTERY W/OUT ANGINA PECTORIS: ICD-10-CM

## 2024-03-04 DIAGNOSIS — E78.5 HYPERLIPIDEMIA, UNSPECIFIED: ICD-10-CM

## 2024-03-04 DIAGNOSIS — R01.1 CARDIAC MURMUR, UNSPECIFIED: ICD-10-CM

## 2024-03-04 DIAGNOSIS — I25.10 ATHEROSCLEROTIC HEART DISEASE OF NATIVE CORONARY ARTERY W/OUT ANGINA PECTORIS: ICD-10-CM

## 2024-03-04 DIAGNOSIS — F17.200 NICOTINE DEPENDENCE, UNSPECIFIED, UNCOMPLICATED: ICD-10-CM

## 2024-03-04 DIAGNOSIS — I45.10 UNSPECIFIED RIGHT BUNDLE-BRANCH BLOCK: ICD-10-CM

## 2024-03-04 DIAGNOSIS — Z98.890 OTHER SPECIFIED POSTPROCEDURAL STATES: ICD-10-CM

## 2024-03-04 DIAGNOSIS — I10 ESSENTIAL (PRIMARY) HYPERTENSION: ICD-10-CM

## 2024-03-04 PROCEDURE — 99214 OFFICE O/P EST MOD 30 MIN: CPT

## 2024-03-04 PROCEDURE — G2211 COMPLEX E/M VISIT ADD ON: CPT

## 2024-03-04 PROCEDURE — 93000 ELECTROCARDIOGRAM COMPLETE: CPT

## 2024-03-04 RX ORDER — TELMISARTAN 80 MG/1
80 TABLET ORAL
Qty: 90 | Refills: 1 | Status: ACTIVE | COMMUNITY
Start: 2024-03-04 | End: 1900-01-01

## 2024-03-04 NOTE — REASON FOR VISIT
[CV Risk Factors and Non-Cardiac Disease] : CV risk factors and non-cardiac disease [Hyperlipidemia] : hyperlipidemia [Carotid, Aortic and Peripheral Vascular Disease] : carotid, aortic and peripheral vascular disease [Hypertension] : hypertension [FreeTextEntry3] : Dr. Cardona [FreeTextEntry1] : This is a 69-year-old male who comes in for cardiac follow-up evaluation. Past medical history significant for claudication/peripheral arterial disease, status post stent to his left lower extremity, hypertension, hyperlipidemia, right bundle branch block.  His cardiac risk factors include smoking up to 3 packs per day for over 30 years (Patient still reports smoking <1 pack per day and has been smoking for the past 53 years) he is currently reducing his nicotine intake), hypertension, and hyperlipidemia.  Today he is feeling generally well and denies recent episodes of chest pain, shortness of breath, palpitations, dizziness or syncope.  Patient complains of gradually worsening numbness in his fingertips of both hands for the past several months. Patient complains of swelling around his feet and ankles that has gradually worsened over the past year. Patient states he regularly follows up with his vascular doctor and is currently trying to schedule an appointment.   In the past, patient was taken off Telmisartan 80 mg because his kidney function was declining. Patient states his PCP prescribed him the Telmisartan again and he has been taking the medication for about a year now. However, he needs a refill of the medication, because he ran out of it 2 days ago.  He remains on ASA 81mg, Amlodipine 10mg, and on Rosuvastatin 20mg PO daily.   EKG done today, 3/4/24 demonstrated sinus rhythm occasiona EKG 6/31/23 demonstrated sinus rhythm at 73 BPM, left atrial enlargement and Right bundle branch block, RAD.

## 2024-03-04 NOTE — PHYSICAL EXAM
[Well Developed] : well developed [Well Nourished] : well nourished [No Acute Distress] : no acute distress [Normal Conjunctiva] : normal conjunctiva [Normal Venous Pressure] : normal venous pressure [Normal S1, S2] : normal S1, S2 [No Carotid Bruit] : no carotid bruit [No Rub] : no rub [No Gallop] : no gallop [Murmur] : murmur [5th Left ICS - MCL] : palpated at the 5th LICS in the midclavicular line [Normal] : normal [Normal Rate] : normal [Rhythm Regular] : regular [Normal S2] : normal S2 [Normal S1] : normal S1 [I] : a grade 1 [Constant] : constant [2+] : left 2+ [Clear Lung Fields] : clear lung fields [Good Air Entry] : good air entry [No Respiratory Distress] : no respiratory distress  [Non Tender] : non-tender [Soft] : abdomen soft [Normal Bowel Sounds] : normal bowel sounds [No Masses/organomegaly] : no masses/organomegaly [Normal Gait] : normal gait [No Cyanosis] : no cyanosis [No Varicosities] : no varicosities [No Clubbing] : no clubbing [Diminished Pedal Pulses ___] : diminished pedal pulses [unfilled] [Edema ___] : edema [unfilled] [No Rash] : no rash [No Skin Lesions] : no skin lesions [Moves all extremities] : moves all extremities [No Focal Deficits] : no focal deficits [Normal Speech] : normal speech [Alert and Oriented] : alert and oriented [Normal memory] : normal memory [de-identified] : Grade 2/5 pansystolic murmur heard best at cardiac apex

## 2024-03-04 NOTE — CARDIOLOGY SUMMARY
[de-identified] : 3/30/22 [de-identified] : 1/11/22 exercise stress test  [de-identified] : 11/18/21 [de-identified] : 2/1/22 [de-identified] : 2/28/22

## 2024-03-04 NOTE — DISCUSSION/SUMMARY
[FreeTextEntry1] : This is a 68-year-old male with past medical history significant for claudication/peripheral arterial disease status post stent to his left lower extremity April 2023 at [facility] (he has a history of stent to his right lower extremity), status post COVID-19 infection April 2023 (contracted at rehab facility), status post stent to his right lower extremity, hypertension, hyperlipidemia, right bundle branch block, who comes in for cardiac follow-up evaluation.  He denies chest pain, shortness of breath, dizziness or syncope.  He does have significant claudication.   He has no history of rheumatic fever.   His cardiac risk factors include smoking up to 3 packs per day for over 30 years, is currently reducing his nicotine intake), hypertension, and hyperlipidemia. He had recent bone spur surgery and is complaining of pain in the hand and leg.  Electrocardiogram done March 4, 2024 demonstrates normal sinus rhythm rate of 82 bpm is otherwise remarkable for right bundle branch block, atrial premature contraction, left atrial abnormality. The patient was taken off his Micardis therapy and then restarted by his primary care physician.  He is not taking the diuretic combination. He will follow-up with his primary care physician in April 2024 and have his blood pressure reevaluated on Micardis 80 mg daily. His wife reports that he does have some pedal edema which may be secondary to his amlodipine therapy or sedentary lifestyle. He will follow-up with his primary care physician in the next 2 weeks regarding his lack of energy, and desire to sleep for 10 to 14 hours. Smoking cessation was reinforced.  PMH:  The patient was subsequently hospitalized at [facility] with a nonhealing wound.  He was found to have significant left lower extremity peripheral arterial disease and had stent placed by Dr. Hill with improvement of the wound healing. He was transferred to a rehabilitation center for further healing and movement therapy and developed COVID-19 infection April 2023.  He has no history of rheumatic fever.   His cardiac risk factors include smoking up to 3 packs per day for over 30 years, is currently reducing his nicotine intake), hypertension, and hyperlipidemia.  Electrocardiogram done July 31, 2023 demonstrated normal sinus rhythm at a rate of 73 bpm is otherwise remarkable for right bundle branch block and nonspecific ST wave changes. He is on amlodipine 10 mg daily and Crestor 20 mg/day.  He reports that his nephrologist had taken him off his ARB therapy.  Electrocardiogram done May 2023 demonstrated normal sinus rhythm rate of 82 bpm is otherwise remarkable for right bundle branch block and left atrial abnormality with nonspecific ST-T wave changes. The patient continues to smoke and smoking cessation was strongly encouraged. I am hopeful that the placement of a left lower extremity stent will motivate him to stop smoking. During his hospitalization for the wound infection I discontinued his Micardis therapy "may affect your kidneys".  He had recent blood work done May 1, 2023 with his primary care physician.  I have asked him to get me a copy of those results for my review.  Patient has been stable on Micardis therapy.  He has a history of chronic renal insufficiency. Cardiac catheterization done February 28, 2022 demonstrated 30% lesion in the proximal left anterior descending artery and 20% lesion in the first diagonal branch, normal left circumflex artery, 40% lesion in the mid right coronary artery, and left main artery with luminal irregularities. Smoking cessation was reinforced again. His LDL target remains less than 70 mg/dL. Blood work done February 9, 2022 demonstrated cholesterol 119, HDL 53, triglycerides of 81, LDL cholesterol of 50 mg/dL creatinine 1.34. Electrocardiogram done August 4, 2022 demonstrated normal sinus rhythm rate of 84 bpm is otherwise remarkable for right bundle rene block and nonspecific T wave inversions in V1 to V3 associated with right bundle branch block. Patient is currently hemodynamically stable and asymptomatic from a cardiac standpoint.  I have asked him to get new blood work with an LDL target of less than 70 mg/dL.  He verbalized a good understanding of the need to follow-up with his vascular surgeon as well as his surgeon who did his bone spur procedure.  The patient had a normal coronary CTA including a calcium score consistent with moderate cardiovascular risk.  He had FFR done which demonstrated a severe lesion in the diagonal branch, and borderline severe lesions in the left anterior descending artery and left circumflex branch.  The patient's activity is limited by his claudication.  He continues to smoke one pack per day. He has claudication in his left lower extremity when he walks more than one block.  -Echo Doppler examination done November 18, 2021 demonstrate minimal mitral valve regurgitation minimal tricuspid valve regurgitation with normal left ventricular ejection fraction 65%.  -Exercise stress test done 2/17/22 demonstrated no evidence of myocardial ischemia at a low workload; patient was only able to exercise into stage one of the Yaakov protocol.  Test was stopped due to claudication. The patient also had a hypertensive response to exercise.  -Electrocardiogram done October 28, 2021 and showed normal sinus rhythm rate 97 beats per min is otherwise remarkable for right bundle branch block, and T-wave inversions in V1 through V4.  A lipid panel done December 1, 2021 demonstrated cholesterol 136, HDL 60, triglycerides 91 mg/dL, LDL cholesterol 59 mg/dL and non-HDL cholesterol 76 mg/dL. The patient's LDL is at target. Smoking cessation was reinforced.  A lipid panel September 14, 2021 demonstrated an LDL cholesterol calculated at 117 mg/dL, total cholesterol 208 mg/dL combination of 54 mg/dL, triglycerides 245 mg/dL.  The patient had LYNDA performed on 9/20/21 due to his intermittent claudication.  Study showed diffuse partially calcified plaque present in the external iliac, common femoral, and popliteal arteries with biphasic waveform and Doppler spectral broadening through systole.  Three vessel runoff is present in the RLE and two vessel run off is present in the LLE; Patent left peroneal artery is not visualized;  Pt was placed on cilostazol 100mg BID by his PMD for such findings and is f/u with in 1-2 weeks.  Pt had a stent placed in his R leg on 12/5/2014.  Pt also had a B/L Duplex US of his carotid arteries on 9/20/21 which did not show any significant stenosis or occlusion. The patient understands that aerobic exercises must be increased to 40 minutes 4 times per week. A detailed discussion of lifestyle modification was done today. The patient has a good understanding of the diagnosis, and treatment plan. Lifestyle modification was also outlined. Thank you for allowing participate in the care of your patient. Please do not hesitate to call for any further questions.

## 2024-05-29 ENCOUNTER — RX RENEWAL (OUTPATIENT)
Age: 69
End: 2024-05-29

## 2024-05-31 ENCOUNTER — RX RENEWAL (OUTPATIENT)
Age: 69
End: 2024-05-31

## 2024-05-31 RX ORDER — ROSUVASTATIN CALCIUM 20 MG/1
20 TABLET, FILM COATED ORAL
Qty: 90 | Refills: 1 | Status: ACTIVE | COMMUNITY
Start: 2024-05-29 | End: 1900-01-01

## 2024-06-03 RX ORDER — ROSUVASTATIN CALCIUM 20 MG/1
20 TABLET, FILM COATED ORAL
Qty: 90 | Refills: 1 | Status: ACTIVE | COMMUNITY
Start: 2021-10-28 | End: 1900-01-01

## 2024-08-28 ENCOUNTER — RX RENEWAL (OUTPATIENT)
Age: 69
End: 2024-08-28

## 2024-09-19 ENCOUNTER — LABORATORY RESULT (OUTPATIENT)
Age: 69
End: 2024-09-19

## 2024-09-19 ENCOUNTER — APPOINTMENT (OUTPATIENT)
Dept: CARDIOLOGY | Facility: CLINIC | Age: 69
End: 2024-09-19
Payer: MEDICARE

## 2024-09-19 ENCOUNTER — NON-APPOINTMENT (OUTPATIENT)
Age: 69
End: 2024-09-19

## 2024-09-19 VITALS
SYSTOLIC BLOOD PRESSURE: 130 MMHG | DIASTOLIC BLOOD PRESSURE: 82 MMHG | BODY MASS INDEX: 33.21 KG/M2 | OXYGEN SATURATION: 99 % | HEIGHT: 70 IN | WEIGHT: 232 LBS | HEART RATE: 77 BPM | TEMPERATURE: 98.2 F | RESPIRATION RATE: 16 BRPM

## 2024-09-19 DIAGNOSIS — Z13.31 ENCOUNTER FOR SCREENING FOR DEPRESSION: ICD-10-CM

## 2024-09-19 DIAGNOSIS — I25.10 ATHEROSCLEROTIC HEART DISEASE OF NATIVE CORONARY ARTERY W/OUT ANGINA PECTORIS: ICD-10-CM

## 2024-09-19 DIAGNOSIS — R01.1 CARDIAC MURMUR, UNSPECIFIED: ICD-10-CM

## 2024-09-19 DIAGNOSIS — Z98.890 OTHER SPECIFIED POSTPROCEDURAL STATES: ICD-10-CM

## 2024-09-19 DIAGNOSIS — I10 ESSENTIAL (PRIMARY) HYPERTENSION: ICD-10-CM

## 2024-09-19 DIAGNOSIS — E78.5 HYPERLIPIDEMIA, UNSPECIFIED: ICD-10-CM

## 2024-09-19 DIAGNOSIS — I65.29 OCCLUSION AND STENOSIS OF UNSPECIFIED CAROTID ARTERY: ICD-10-CM

## 2024-09-19 DIAGNOSIS — F17.200 NICOTINE DEPENDENCE, UNSPECIFIED, UNCOMPLICATED: ICD-10-CM

## 2024-09-19 DIAGNOSIS — I45.10 UNSPECIFIED RIGHT BUNDLE-BRANCH BLOCK: ICD-10-CM

## 2024-09-19 PROCEDURE — G0444 DEPRESSION SCREEN ANNUAL: CPT | Mod: 59

## 2024-09-19 PROCEDURE — 99214 OFFICE O/P EST MOD 30 MIN: CPT

## 2024-09-19 PROCEDURE — 93880 EXTRACRANIAL BILAT STUDY: CPT

## 2024-09-19 PROCEDURE — G2211 COMPLEX E/M VISIT ADD ON: CPT

## 2024-09-19 PROCEDURE — 93000 ELECTROCARDIOGRAM COMPLETE: CPT | Mod: 59

## 2024-09-19 PROCEDURE — 93306 TTE W/DOPPLER COMPLETE: CPT

## 2024-09-19 NOTE — ASSESSMENT
[FreeTextEntry1] : This is a 69-year-old male here today for follow-up cardiac evaluation.   He has a past medical history significant for claudication/peripheral arterial disease, status post stent to his right lower extremity, hypertension, hyperlipidemia, right bundle branch block.  He has no history of rheumatic fever.    Cardiac risk factors include smoking up to 3 packs per day for over 30 years, is currently reducing his nicotine intake), hypertension, and hyperlipidemia.   HPI: He is feeling generally well today and denies chest pain, dizziness, heart palpitations, recent episodes of syncope or falls, SOB, or dyspnea at this time.  Current Medications: ASA 81mg PO DAILY, Amlodipine 10mg PO DAILY, Telmisartan HCTZ 80-12.5 mg PO DAILY and Rosuvastatin 20mg PO DAILY.   He would like to make a note that he is following up with his vascular doctor for his chronic bilateral lower extremity edema. He does have a history of PVD.   Depression screening completed. Discussed negative results with patient during visit. Patient will follow-up with their primary care provider for mental health management if needed in the future.  BLOOD PRESSURE: -BP is well controlled in today's visit    BLOOD WORK:  *LDL target goal < 70* -New blood work was done 09/19/2024 to evaluate lipid profile, CBC, BMP, hepatic function, A1C and TSH. -Blood work done March 2024 demonstrated triglyceride 192, cholesterol 122, HDL 45, LDL 46, non-HDL 77, LDL direct 49, hemoglobin A1c 6%.  -Blood work done May 2023 demonstrated cholesterol 131, triglycerides 132, HDL 50, LDL 59, Non-HDL 81.   TESTING/REPORTS: -EKG done 09/19/2024 demonstrated regular sinus rhythm rate 77 bpm otherwise remarkable for right bundle branch block and poor R wave progression.   -Echocardiogram done today 09/19/2024 in office with results pending.  -Bilateral carotid doppler done today 09/19/2024 in office with results pending.  -Electrocardiogram done March 4, 2024 demonstrates normal sinus rhythm rate of 82 bpm is otherwise remarkable for right bundle branch block, atrial premature contraction, left atrial abnormality.  -Electrocardiogram done July 31, 2023 demonstrated normal sinus rhythm at a rate of 73 bpm is otherwise remarkable for right bundle branch block and nonspecific ST wave changes.   -Electrocardiogram done May 2023 demonstrated normal sinus rhythm rate of 82 bpm is otherwise remarkable for right bundle branch block and left atrial abnormality with nonspecific ST-T wave changes.  -EKG done Mar 30, 2022 which demonstrated regular sinus rhythm with nonspecific ST-T wave changes, BPM of 90 otherwise remarkable for right bundle branch block, and T-wave inversions in V1 through V4.  -Cardiac Cath done on 02/28/22 which demonstrated mild CAD 30% in the LAD and 20% in the first diagonal LAD, 40% stenosis in the RCA.   -The patient had a normal coronary CTA February 2022 including a calcium score consistent with moderate cardiovascular risk.  He had FFR done which demonstrated a severe lesion in the diagonal branch, and borderline severe lesions in the left anterior descending artery and left circumflex branch.  -Exercise stress test done 2/17/22 demonstrated no evidence of myocardial ischemia at a low workload; patient was only able to exercise into stage one of the Yaakov protocol.  Test was stopped due to claudication. The patient also had a hypertensive response to exercise.  -Echo Doppler examination done November 18, 2021 demonstrate minimal mitral valve regurgitation minimal tricuspid valve regurgitation with normal left ventricular ejection fraction 65%.  -Electrocardiogram done October 28, 2021 and showed normal sinus rhythm rate 97 beats per min is otherwise remarkable for right bundle branch block, and T-wave inversions in V1 through V4.  PMH: The patient had LYNDA performed on 9/20/21 due to his intermittent claudication.  Study showed diffuse partially calcified plaque present in the external iliac, common femoral, and popliteal arteries with biphasic waveform and Doppler spectral broadening through systole.  Three vessel runoff is present in the RLE and two vessel run off is present in the LLE; Patent left peroneal artery is not visualized;  Pt was placed on cilostazol 100mg BID by his PMD for such findings and is f/u with in 1-2 weeks.  Pt had a stent placed in his R leg on 12/5/2014.  Pt also had a B/L Duplex US of his carotid arteries on 9/20/21 which did not show any significant stenosis or occlusion.  PLAN: -He will continue with his usual medications and will contact the office if he is having any complaints between now and their next follow up appointment. -Discussed him completing an exercise stress test to evaluate for coronary artery disease. He would prefer to hold off at this time.  -Smoking cessation was enforced. -He will follow up with vascular in regard to his edema and claudication/PVD.   I have discussed the plan of care with EVE WILKINSONSHAW and he will follow up in 3-4 months. They are compliant with all of their medications.     The patient understands that aerobic exercises must be increased to 40 minutes 4 times/week and a detailed discussion of lifestyle modification was done today.   The patient has a good understanding of the diagnosis, treatment plan and lifestyle modification.   They will contact me at the office for any questions with their care or any changes in their health status.   The patient was discussed with supervision physician Dr. Carter Whitney at the time of the visit and the plan of care will be carried out as noted above.     JEREMÍAS Spears NP

## 2024-09-19 NOTE — PHYSICAL EXAM
[Well Developed] : well developed [Well Nourished] : well nourished [No Acute Distress] : no acute distress [Normal Conjunctiva] : normal conjunctiva [Normal Venous Pressure] : normal venous pressure [No Carotid Bruit] : no carotid bruit [Normal S1, S2] : normal S1, S2 [No Rub] : no rub [No Gallop] : no gallop [Murmur] : murmur [5th Left ICS - MCL] : palpated at the 5th LICS in the midclavicular line [Normal] : normal [Normal Rate] : normal [Rhythm Regular] : regular [Normal S1] : normal S1 [Normal S2] : normal S2 [I] : a grade 1 [Constant] : constant [2+] : left 2+ [Clear Lung Fields] : clear lung fields [Good Air Entry] : good air entry [No Respiratory Distress] : no respiratory distress  [Soft] : abdomen soft [Non Tender] : non-tender [No Masses/organomegaly] : no masses/organomegaly [Normal Bowel Sounds] : normal bowel sounds [Normal Gait] : normal gait [No Cyanosis] : no cyanosis [No Clubbing] : no clubbing [No Varicosities] : no varicosities [Diminished Pedal Pulses ___] : diminished pedal pulses [unfilled] [Edema ___] : edema [unfilled] [No Rash] : no rash [No Skin Lesions] : no skin lesions [Moves all extremities] : moves all extremities [No Focal Deficits] : no focal deficits [Normal Speech] : normal speech [Alert and Oriented] : alert and oriented [Normal memory] : normal memory [de-identified] : Grade 2/5 pansystolic murmur heard best at cardiac apex

## 2024-09-19 NOTE — CARDIOLOGY SUMMARY
[de-identified] : 3/30/22 [de-identified] : 1/11/22 exercise stress test  [de-identified] : 11/18/21 [de-identified] : 2/1/22 [de-identified] : 2/28/22

## 2024-09-19 NOTE — REASON FOR VISIT
[CV Risk Factors and Non-Cardiac Disease] : CV risk factors and non-cardiac disease [Hyperlipidemia] : hyperlipidemia [Hypertension] : hypertension [Carotid, Aortic and Peripheral Vascular Disease] : carotid, aortic and peripheral vascular disease [FreeTextEntry3] : Dr. Cardona [FreeTextEntry1] : This is a 69-year-old male who comes in for cardiac follow-up evaluation. Past medical history significant for claudication/peripheral arterial disease, status post stent to his left lower extremity, hypertension, hyperlipidemia, right bundle branch block.  His cardiac risk factors include smoking up to 3 packs per day for over 30 years (Patient still reports smoking <1 pack per day and has been smoking for the past 53 years) he is currently reducing his nicotine intake), hypertension, and hyperlipidemia.  Today he is feeling generally well and denies recent episodes of chest pain, shortness of breath, palpitations, dizziness or syncope.  Patient complains of gradually worsening numbness in his fingertips of both hands for the past several months. Patient complains of swelling around his feet and ankles that has gradually worsened over the past year. Patient states he regularly follows up with his vascular doctor and is currently trying to schedule an appointment.   In the past, patient was taken off Telmisartan 80 mg because his kidney function was declining. Patient states his PCP prescribed him the Telmisartan again and he has been taking the medication for about a year now. However, he needs a refill of the medication, because he ran out of it 2 days ago.  He remains on ASA 81mg, Amlodipine 10mg, and on Rosuvastatin 20mg PO daily.   EKG done today, 3/4/24 demonstrated sinus rhythm occasiona EKG 6/31/23 demonstrated sinus rhythm at 73 BPM, left atrial enlargement and Right bundle branch block, RAD.

## 2024-09-19 NOTE — DISCUSSION/SUMMARY
[FreeTextEntry1] : Dr. Whitney-(PRIOR VISIT and PMH WITH Dr. Whitney): This is a 68-year-old male with past medical history significant for claudication/peripheral arterial disease status post stent to his left lower extremity April 2023 at [facility] (he has a history of stent to his right lower extremity), status post COVID-19 infection April 2023 (contracted at rehab facility), status post stent to his right lower extremity, hypertension, hyperlipidemia, right bundle branch block, who comes in for cardiac follow-up evaluation.  He denies chest pain, shortness of breath, dizziness or syncope.  He does have significant claudication.   He has no history of rheumatic fever.   His cardiac risk factors include smoking up to 3 packs per day for over 30 years, is currently reducing his nicotine intake), hypertension, and hyperlipidemia. He had recent bone spur surgery and is complaining of pain in the hand and leg.  Electrocardiogram done March 4, 2024 demonstrates normal sinus rhythm rate of 82 bpm is otherwise remarkable for right bundle branch block, atrial premature contraction, left atrial abnormality. The patient was taken off his Micardis therapy and then restarted by his primary care physician.  He is not taking the diuretic combination. He will follow-up with his primary care physician in April 2024 and have his blood pressure reevaluated on Micardis 80 mg daily. His wife reports that he does have some pedal edema which may be secondary to his amlodipine therapy or sedentary lifestyle. He will follow-up with his primary care physician in the next 2 weeks regarding his lack of energy, and desire to sleep for 10 to 14 hours. Smoking cessation was reinforced.  PMH:  The patient was subsequently hospitalized at [facility] with a nonhealing wound.  He was found to have significant left lower extremity peripheral arterial disease and had stent placed by Dr. Hill with improvement of the wound healing. He was transferred to a rehabilitation center for further healing and movement therapy and developed COVID-19 infection April 2023.  He has no history of rheumatic fever.   His cardiac risk factors include smoking up to 3 packs per day for over 30 years, is currently reducing his nicotine intake), hypertension, and hyperlipidemia.  Electrocardiogram done July 31, 2023 demonstrated normal sinus rhythm at a rate of 73 bpm is otherwise remarkable for right bundle branch block and nonspecific ST wave changes. He is on amlodipine 10 mg daily and Crestor 20 mg/day.  He reports that his nephrologist had taken him off his ARB therapy.  Electrocardiogram done May 2023 demonstrated normal sinus rhythm rate of 82 bpm is otherwise remarkable for right bundle branch block and left atrial abnormality with nonspecific ST-T wave changes. The patient continues to smoke and smoking cessation was strongly encouraged. I am hopeful that the placement of a left lower extremity stent will motivate him to stop smoking. During his hospitalization for the wound infection I discontinued his Micardis therapy "may affect your kidneys".  He had recent blood work done May 1, 2023 with his primary care physician.  I have asked him to get me a copy of those results for my review.  Patient has been stable on Micardis therapy.  He has a history of chronic renal insufficiency. Cardiac catheterization done February 28, 2022 demonstrated 30% lesion in the proximal left anterior descending artery and 20% lesion in the first diagonal branch, normal left circumflex artery, 40% lesion in the mid right coronary artery, and left main artery with luminal irregularities. Smoking cessation was reinforced again. His LDL target remains less than 70 mg/dL. Blood work done February 9, 2022 demonstrated cholesterol 119, HDL 53, triglycerides of 81, LDL cholesterol of 50 mg/dL creatinine 1.34. Electrocardiogram done August 4, 2022 demonstrated normal sinus rhythm rate of 84 bpm is otherwise remarkable for right bundle rene block and nonspecific T wave inversions in V1 to V3 associated with right bundle branch block. Patient is currently hemodynamically stable and asymptomatic from a cardiac standpoint.  I have asked him to get new blood work with an LDL target of less than 70 mg/dL.  He verbalized a good understanding of the need to follow-up with his vascular surgeon as well as his surgeon who did his bone spur procedure.  The patient had a normal coronary CTA including a calcium score consistent with moderate cardiovascular risk.  He had FFR done which demonstrated a severe lesion in the diagonal branch, and borderline severe lesions in the left anterior descending artery and left circumflex branch.  The patient's activity is limited by his claudication.  He continues to smoke one pack per day. He has claudication in his left lower extremity when he walks more than one block.  -Echo Doppler examination done November 18, 2021 demonstrate minimal mitral valve regurgitation minimal tricuspid valve regurgitation with normal left ventricular ejection fraction 65%.  -Exercise stress test done 2/17/22 demonstrated no evidence of myocardial ischemia at a low workload; patient was only able to exercise into stage one of the Yaakov protocol.  Test was stopped due to claudication. The patient also had a hypertensive response to exercise.  -Electrocardiogram done October 28, 2021 and showed normal sinus rhythm rate 97 beats per min is otherwise remarkable for right bundle branch block, and T-wave inversions in V1 through V4.  A lipid panel done December 1, 2021 demonstrated cholesterol 136, HDL 60, triglycerides 91 mg/dL, LDL cholesterol 59 mg/dL and non-HDL cholesterol 76 mg/dL. The patient's LDL is at target. Smoking cessation was reinforced.  A lipid panel September 14, 2021 demonstrated an LDL cholesterol calculated at 117 mg/dL, total cholesterol 208 mg/dL combination of 54 mg/dL, triglycerides 245 mg/dL.  The patient had LYNDA performed on 9/20/21 due to his intermittent claudication.  Study showed diffuse partially calcified plaque present in the external iliac, common femoral, and popliteal arteries with biphasic waveform and Doppler spectral broadening through systole.  Three vessel runoff is present in the RLE and two vessel run off is present in the LLE; Patent left peroneal artery is not visualized;  Pt was placed on cilostazol 100mg BID by his PMD for such findings and is f/u with in 1-2 weeks.  Pt had a stent placed in his R leg on 12/5/2014.  Pt also had a B/L Duplex US of his carotid arteries on 9/20/21 which did not show any significant stenosis or occlusion. The patient understands that aerobic exercises must be increased to 40 minutes 4 times per week. A detailed discussion of lifestyle modification was done today. The patient has a good understanding of the diagnosis, and treatment plan. Lifestyle modification was also outlined. Thank you for allowing participate in the care of your patient. Please do not hesitate to call for any further questions.

## 2024-09-21 PROBLEM — I65.29 CAROTID ARTERY PLAQUE: Status: ACTIVE | Noted: 2024-09-18

## 2025-02-24 ENCOUNTER — RX RENEWAL (OUTPATIENT)
Age: 70
End: 2025-02-24

## 2025-04-02 ENCOUNTER — LABORATORY RESULT (OUTPATIENT)
Age: 70
End: 2025-04-02

## 2025-04-02 ENCOUNTER — NON-APPOINTMENT (OUTPATIENT)
Age: 70
End: 2025-04-02

## 2025-04-02 ENCOUNTER — APPOINTMENT (OUTPATIENT)
Dept: CARDIOLOGY | Facility: CLINIC | Age: 70
End: 2025-04-02
Payer: MEDICARE

## 2025-04-02 VITALS
HEART RATE: 77 BPM | DIASTOLIC BLOOD PRESSURE: 82 MMHG | WEIGHT: 240 LBS | OXYGEN SATURATION: 99 % | RESPIRATION RATE: 16 BRPM | TEMPERATURE: 97.6 F | BODY MASS INDEX: 34.36 KG/M2 | SYSTOLIC BLOOD PRESSURE: 126 MMHG | HEIGHT: 70 IN

## 2025-04-02 DIAGNOSIS — I25.10 ATHEROSCLEROTIC HEART DISEASE OF NATIVE CORONARY ARTERY W/OUT ANGINA PECTORIS: ICD-10-CM

## 2025-04-02 DIAGNOSIS — I10 ESSENTIAL (PRIMARY) HYPERTENSION: ICD-10-CM

## 2025-04-02 DIAGNOSIS — E78.5 HYPERLIPIDEMIA, UNSPECIFIED: ICD-10-CM

## 2025-04-02 DIAGNOSIS — I45.10 UNSPECIFIED RIGHT BUNDLE-BRANCH BLOCK: ICD-10-CM

## 2025-04-02 PROCEDURE — 93000 ELECTROCARDIOGRAM COMPLETE: CPT

## 2025-04-02 PROCEDURE — 99214 OFFICE O/P EST MOD 30 MIN: CPT

## 2025-04-02 PROCEDURE — G2211 COMPLEX E/M VISIT ADD ON: CPT

## 2025-04-04 LAB — POTASSIUM SERPL-SCNC: 5.1 MMOL/L

## 2025-07-28 ENCOUNTER — RX RENEWAL (OUTPATIENT)
Age: 70
End: 2025-07-28

## 2025-08-25 ENCOUNTER — RX RENEWAL (OUTPATIENT)
Age: 70
End: 2025-08-25